# Patient Record
Sex: MALE | Race: WHITE | NOT HISPANIC OR LATINO | Employment: FULL TIME | URBAN - METROPOLITAN AREA
[De-identification: names, ages, dates, MRNs, and addresses within clinical notes are randomized per-mention and may not be internally consistent; named-entity substitution may affect disease eponyms.]

---

## 2024-05-06 ENCOUNTER — APPOINTMENT (OUTPATIENT)
Dept: RADIOLOGY | Facility: HOSPITAL | Age: 49
DRG: 062 | End: 2024-05-06
Payer: COMMERCIAL

## 2024-05-06 ENCOUNTER — HOSPITAL ENCOUNTER (INPATIENT)
Facility: HOSPITAL | Age: 49
LOS: 2 days | Discharge: HOME | DRG: 062 | End: 2024-05-09
Attending: STUDENT IN AN ORGANIZED HEALTH CARE EDUCATION/TRAINING PROGRAM | Admitting: INTERNAL MEDICINE
Payer: COMMERCIAL

## 2024-05-06 ENCOUNTER — APPOINTMENT (OUTPATIENT)
Dept: CARDIOLOGY | Facility: HOSPITAL | Age: 49
DRG: 062 | End: 2024-05-06
Payer: COMMERCIAL

## 2024-05-06 DIAGNOSIS — R51.9 ACUTE NONINTRACTABLE HEADACHE, UNSPECIFIED HEADACHE TYPE: ICD-10-CM

## 2024-05-06 DIAGNOSIS — I63.9 STROKE ABORTED BY ADMINISTRATION OF THROMBOLYTIC AGENT (MULTI): Primary | ICD-10-CM

## 2024-05-06 LAB
ALBUMIN SERPL BCP-MCNC: 4.7 G/DL (ref 3.4–5)
ALP SERPL-CCNC: 65 U/L (ref 33–120)
ALT SERPL W P-5'-P-CCNC: 32 U/L (ref 10–52)
ANION GAP SERPL CALC-SCNC: 13 MMOL/L (ref 10–20)
APTT PPP: 28 SECONDS (ref 27–38)
AST SERPL W P-5'-P-CCNC: 28 U/L (ref 9–39)
BASOPHILS # BLD AUTO: 0.06 X10*3/UL (ref 0–0.1)
BASOPHILS NFR BLD AUTO: 0.6 %
BILIRUB SERPL-MCNC: 0.5 MG/DL (ref 0–1.2)
BUN SERPL-MCNC: 22 MG/DL (ref 6–23)
CALCIUM SERPL-MCNC: 9.9 MG/DL (ref 8.6–10.3)
CARDIAC TROPONIN I PNL SERPL HS: <3 NG/L (ref 0–20)
CHLORIDE SERPL-SCNC: 103 MMOL/L (ref 98–107)
CO2 SERPL-SCNC: 26 MMOL/L (ref 21–32)
CREAT SERPL-MCNC: 1.27 MG/DL (ref 0.5–1.3)
EGFRCR SERPLBLD CKD-EPI 2021: 70 ML/MIN/1.73M*2
EOSINOPHIL # BLD AUTO: 0.49 X10*3/UL (ref 0–0.7)
EOSINOPHIL NFR BLD AUTO: 5.3 %
ERYTHROCYTE [DISTWIDTH] IN BLOOD BY AUTOMATED COUNT: 13 % (ref 11.5–14.5)
GLUCOSE BLD MANUAL STRIP-MCNC: 114 MG/DL (ref 74–99)
GLUCOSE SERPL-MCNC: 111 MG/DL (ref 74–99)
HCT VFR BLD AUTO: 44 % (ref 41–52)
HGB BLD-MCNC: 14.3 G/DL (ref 13.5–17.5)
IMM GRANULOCYTES # BLD AUTO: 0.01 X10*3/UL (ref 0–0.7)
IMM GRANULOCYTES NFR BLD AUTO: 0.1 % (ref 0–0.9)
INR PPP: 1.1 (ref 0.9–1.1)
LYMPHOCYTES # BLD AUTO: 5.98 X10*3/UL (ref 1.2–4.8)
LYMPHOCYTES NFR BLD AUTO: 64.2 %
MCH RBC QN AUTO: 27.9 PG (ref 26–34)
MCHC RBC AUTO-ENTMCNC: 32.5 G/DL (ref 32–36)
MCV RBC AUTO: 86 FL (ref 80–100)
MONOCYTES # BLD AUTO: 0.68 X10*3/UL (ref 0.1–1)
MONOCYTES NFR BLD AUTO: 7.3 %
NEUTROPHILS # BLD AUTO: 2.09 X10*3/UL (ref 1.2–7.7)
NEUTROPHILS NFR BLD AUTO: 22.5 %
NRBC BLD-RTO: 0.2 /100 WBCS (ref 0–0)
PLATELET # BLD AUTO: 238 X10*3/UL (ref 150–450)
POTASSIUM SERPL-SCNC: 3.7 MMOL/L (ref 3.5–5.3)
PROT SERPL-MCNC: 6.6 G/DL (ref 6.4–8.2)
PROTHROMBIN TIME: 12.6 SECONDS (ref 9.8–12.8)
RBC # BLD AUTO: 5.12 X10*6/UL (ref 4.5–5.9)
SODIUM SERPL-SCNC: 138 MMOL/L (ref 136–145)
WBC # BLD AUTO: 9.3 X10*3/UL (ref 4.4–11.3)

## 2024-05-06 PROCEDURE — 99291 CRITICAL CARE FIRST HOUR: CPT | Mod: 25 | Performed by: STUDENT IN AN ORGANIZED HEALTH CARE EDUCATION/TRAINING PROGRAM

## 2024-05-06 PROCEDURE — 70496 CT ANGIOGRAPHY HEAD: CPT | Performed by: RADIOLOGY

## 2024-05-06 PROCEDURE — 70498 CT ANGIOGRAPHY NECK: CPT | Performed by: RADIOLOGY

## 2024-05-06 PROCEDURE — 2500000004 HC RX 250 GENERAL PHARMACY W/ HCPCS (ALT 636 FOR OP/ED): Mod: JZ | Performed by: STUDENT IN AN ORGANIZED HEALTH CARE EDUCATION/TRAINING PROGRAM

## 2024-05-06 PROCEDURE — 82947 ASSAY GLUCOSE BLOOD QUANT: CPT

## 2024-05-06 PROCEDURE — 85610 PROTHROMBIN TIME: CPT | Performed by: STUDENT IN AN ORGANIZED HEALTH CARE EDUCATION/TRAINING PROGRAM

## 2024-05-06 PROCEDURE — 85025 COMPLETE CBC W/AUTO DIFF WBC: CPT | Performed by: STUDENT IN AN ORGANIZED HEALTH CARE EDUCATION/TRAINING PROGRAM

## 2024-05-06 PROCEDURE — 2500000004 HC RX 250 GENERAL PHARMACY W/ HCPCS (ALT 636 FOR OP/ED)

## 2024-05-06 PROCEDURE — 96375 TX/PRO/DX INJ NEW DRUG ADDON: CPT

## 2024-05-06 PROCEDURE — 36415 COLL VENOUS BLD VENIPUNCTURE: CPT | Performed by: STUDENT IN AN ORGANIZED HEALTH CARE EDUCATION/TRAINING PROGRAM

## 2024-05-06 PROCEDURE — 99291 CRITICAL CARE FIRST HOUR: CPT | Performed by: STUDENT IN AN ORGANIZED HEALTH CARE EDUCATION/TRAINING PROGRAM

## 2024-05-06 PROCEDURE — 84165 PROTEIN E-PHORESIS SERUM: CPT | Performed by: NURSE PRACTITIONER

## 2024-05-06 PROCEDURE — 84484 ASSAY OF TROPONIN QUANT: CPT | Performed by: STUDENT IN AN ORGANIZED HEALTH CARE EDUCATION/TRAINING PROGRAM

## 2024-05-06 PROCEDURE — 93010 ELECTROCARDIOGRAM REPORT: CPT | Performed by: STUDENT IN AN ORGANIZED HEALTH CARE EDUCATION/TRAINING PROGRAM

## 2024-05-06 PROCEDURE — 70450 CT HEAD/BRAIN W/O DYE: CPT

## 2024-05-06 PROCEDURE — 86334 IMMUNOFIX E-PHORESIS SERUM: CPT | Mod: STJLAB | Performed by: NURSE PRACTITIONER

## 2024-05-06 PROCEDURE — 83718 ASSAY OF LIPOPROTEIN: CPT | Performed by: NURSE PRACTITIONER

## 2024-05-06 PROCEDURE — 80053 COMPREHEN METABOLIC PANEL: CPT | Performed by: STUDENT IN AN ORGANIZED HEALTH CARE EDUCATION/TRAINING PROGRAM

## 2024-05-06 PROCEDURE — 2550000001 HC RX 255 CONTRASTS: Performed by: STUDENT IN AN ORGANIZED HEALTH CARE EDUCATION/TRAINING PROGRAM

## 2024-05-06 PROCEDURE — 86320 SERUM IMMUNOELECTROPHORESIS: CPT | Performed by: NURSE PRACTITIONER

## 2024-05-06 PROCEDURE — 70498 CT ANGIOGRAPHY NECK: CPT

## 2024-05-06 PROCEDURE — 93005 ELECTROCARDIOGRAM TRACING: CPT

## 2024-05-06 PROCEDURE — 96374 THER/PROPH/DIAG INJ IV PUSH: CPT

## 2024-05-06 PROCEDURE — 83036 HEMOGLOBIN GLYCOSYLATED A1C: CPT | Mod: STJLAB | Performed by: NURSE PRACTITIONER

## 2024-05-06 PROCEDURE — 85730 THROMBOPLASTIN TIME PARTIAL: CPT | Performed by: STUDENT IN AN ORGANIZED HEALTH CARE EDUCATION/TRAINING PROGRAM

## 2024-05-06 RX ORDER — ONDANSETRON HYDROCHLORIDE 2 MG/ML
4 INJECTION, SOLUTION INTRAVENOUS ONCE
Status: COMPLETED | OUTPATIENT
Start: 2024-05-06 | End: 2024-05-06

## 2024-05-06 RX ORDER — ONDANSETRON HYDROCHLORIDE 2 MG/ML
INJECTION, SOLUTION INTRAVENOUS
Status: COMPLETED
Start: 2024-05-06 | End: 2024-05-06

## 2024-05-06 RX ORDER — HYDRALAZINE HYDROCHLORIDE 20 MG/ML
5 INJECTION INTRAMUSCULAR; INTRAVENOUS EVERY 4 HOURS PRN
Status: DISCONTINUED | OUTPATIENT
Start: 2024-05-06 | End: 2024-05-09 | Stop reason: HOSPADM

## 2024-05-06 RX ADMIN — ONDANSETRON 4 MG: 2 INJECTION INTRAMUSCULAR; INTRAVENOUS at 23:29

## 2024-05-06 RX ADMIN — ONDANSETRON HYDROCHLORIDE 4 MG: 2 INJECTION, SOLUTION INTRAVENOUS at 23:29

## 2024-05-06 RX ADMIN — IOHEXOL 70 ML: 350 INJECTION, SOLUTION INTRAVENOUS at 22:55

## 2024-05-06 RX ADMIN — Medication 21 MG: at 22:38

## 2024-05-06 ASSESSMENT — PAIN DESCRIPTION - PAIN TYPE: TYPE: ACUTE PAIN

## 2024-05-06 ASSESSMENT — PAIN SCALES - GENERAL
PAINLEVEL_OUTOF10: 4
PAINLEVEL_OUTOF10: 0 - NO PAIN
PAINLEVEL_OUTOF10: 9

## 2024-05-06 ASSESSMENT — PAIN - FUNCTIONAL ASSESSMENT: PAIN_FUNCTIONAL_ASSESSMENT: 0-10

## 2024-05-06 ASSESSMENT — PAIN DESCRIPTION - LOCATION: LOCATION: HEAD

## 2024-05-07 ENCOUNTER — APPOINTMENT (OUTPATIENT)
Dept: CARDIOLOGY | Facility: HOSPITAL | Age: 49
DRG: 062 | End: 2024-05-07
Payer: COMMERCIAL

## 2024-05-07 ENCOUNTER — APPOINTMENT (OUTPATIENT)
Dept: RADIOLOGY | Facility: HOSPITAL | Age: 49
DRG: 062 | End: 2024-05-07
Payer: COMMERCIAL

## 2024-05-07 PROBLEM — R00.1 BRADYCARDIA: Status: ACTIVE | Noted: 2024-05-07

## 2024-05-07 LAB
CHOLEST SERPL-MCNC: 189 MG/DL (ref 0–199)
CHOLESTEROL/HDL RATIO: 3
CRP SERPL-MCNC: <0.1 MG/DL
ERYTHROCYTE [SEDIMENTATION RATE] IN BLOOD BY WESTERGREN METHOD: <1 MM/H (ref 0–15)
GLUCOSE BLD MANUAL STRIP-MCNC: 108 MG/DL (ref 74–99)
GLUCOSE BLD MANUAL STRIP-MCNC: 97 MG/DL (ref 74–99)
HDLC SERPL-MCNC: 62.5 MG/DL
NON-HDL CHOLESTEROL: 127 MG/DL (ref 0–149)

## 2024-05-07 PROCEDURE — 36415 COLL VENOUS BLD VENIPUNCTURE: CPT | Performed by: NURSE PRACTITIONER

## 2024-05-07 PROCEDURE — 97116 GAIT TRAINING THERAPY: CPT | Mod: GP | Performed by: PHYSICAL THERAPIST

## 2024-05-07 PROCEDURE — 70498 CT ANGIOGRAPHY NECK: CPT

## 2024-05-07 PROCEDURE — 99291 CRITICAL CARE FIRST HOUR: CPT

## 2024-05-07 PROCEDURE — 85613 RUSSELL VIPER VENOM DILUTED: CPT | Mod: STJLAB | Performed by: NURSE PRACTITIONER

## 2024-05-07 PROCEDURE — 81240 F2 GENE: CPT | Performed by: NURSE PRACTITIONER

## 2024-05-07 PROCEDURE — 99223 1ST HOSP IP/OBS HIGH 75: CPT | Performed by: NURSE PRACTITIONER

## 2024-05-07 PROCEDURE — 70450 CT HEAD/BRAIN W/O DYE: CPT

## 2024-05-07 PROCEDURE — 93306 TTE W/DOPPLER COMPLETE: CPT | Performed by: INTERNAL MEDICINE

## 2024-05-07 PROCEDURE — 2020000001 HC ICU ROOM DAILY

## 2024-05-07 PROCEDURE — 81241 F5 GENE: CPT | Performed by: NURSE PRACTITIONER

## 2024-05-07 PROCEDURE — 84155 ASSAY OF PROTEIN SERUM: CPT | Mod: STJLAB | Performed by: NURSE PRACTITIONER

## 2024-05-07 PROCEDURE — 92610 EVALUATE SWALLOWING FUNCTION: CPT | Mod: GN | Performed by: SPEECH-LANGUAGE PATHOLOGIST

## 2024-05-07 PROCEDURE — 97165 OT EVAL LOW COMPLEX 30 MIN: CPT | Mod: GO

## 2024-05-07 PROCEDURE — 85302 CLOT INHIBIT PROT C ANTIGEN: CPT | Performed by: NURSE PRACTITIONER

## 2024-05-07 PROCEDURE — 70498 CT ANGIOGRAPHY NECK: CPT | Performed by: RADIOLOGY

## 2024-05-07 PROCEDURE — 2500000001 HC RX 250 WO HCPCS SELF ADMINISTERED DRUGS (ALT 637 FOR MEDICARE OP)

## 2024-05-07 PROCEDURE — 83090 ASSAY OF HOMOCYSTEINE: CPT | Mod: STJLAB | Performed by: NURSE PRACTITIONER

## 2024-05-07 PROCEDURE — 99233 SBSQ HOSP IP/OBS HIGH 50: CPT

## 2024-05-07 PROCEDURE — 70496 CT ANGIOGRAPHY HEAD: CPT | Performed by: RADIOLOGY

## 2024-05-07 PROCEDURE — 70551 MRI BRAIN STEM W/O DYE: CPT

## 2024-05-07 PROCEDURE — 3E03317 INTRODUCTION OF OTHER THROMBOLYTIC INTO PERIPHERAL VEIN, PERCUTANEOUS APPROACH: ICD-10-PCS

## 2024-05-07 PROCEDURE — 2550000001 HC RX 255 CONTRASTS: Performed by: NURSE PRACTITIONER

## 2024-05-07 PROCEDURE — 93306 TTE W/DOPPLER COMPLETE: CPT

## 2024-05-07 PROCEDURE — 85652 RBC SED RATE AUTOMATED: CPT | Performed by: NURSE PRACTITIONER

## 2024-05-07 PROCEDURE — 2500000001 HC RX 250 WO HCPCS SELF ADMINISTERED DRUGS (ALT 637 FOR MEDICARE OP): Performed by: NURSE PRACTITIONER

## 2024-05-07 PROCEDURE — 86147 CARDIOLIPIN ANTIBODY EA IG: CPT | Mod: STJLAB | Performed by: NURSE PRACTITIONER

## 2024-05-07 PROCEDURE — G0452 MOLECULAR PATHOLOGY INTERPR: HCPCS | Performed by: NURSE PRACTITIONER

## 2024-05-07 PROCEDURE — 2500000004 HC RX 250 GENERAL PHARMACY W/ HCPCS (ALT 636 FOR OP/ED)

## 2024-05-07 PROCEDURE — 86140 C-REACTIVE PROTEIN: CPT | Performed by: NURSE PRACTITIONER

## 2024-05-07 PROCEDURE — 97161 PT EVAL LOW COMPLEX 20 MIN: CPT | Mod: GP | Performed by: PHYSICAL THERAPIST

## 2024-05-07 PROCEDURE — 85305 CLOT INHIBIT PROT S TOTAL: CPT | Performed by: NURSE PRACTITIONER

## 2024-05-07 PROCEDURE — 82947 ASSAY GLUCOSE BLOOD QUANT: CPT

## 2024-05-07 PROCEDURE — 86146 BETA-2 GLYCOPROTEIN ANTIBODY: CPT | Mod: STJLAB | Performed by: NURSE PRACTITIONER

## 2024-05-07 RX ORDER — ACETAMINOPHEN 160 MG/5ML
650 SOLUTION ORAL EVERY 4 HOURS PRN
Status: DISCONTINUED | OUTPATIENT
Start: 2024-05-07 | End: 2024-05-09 | Stop reason: HOSPADM

## 2024-05-07 RX ORDER — DOCUSATE SODIUM 100 MG/1
100 CAPSULE, LIQUID FILLED ORAL 2 TIMES DAILY
Status: DISCONTINUED | OUTPATIENT
Start: 2024-05-07 | End: 2024-05-09 | Stop reason: HOSPADM

## 2024-05-07 RX ORDER — MAGNESIUM SULFATE HEPTAHYDRATE 40 MG/ML
4 INJECTION, SOLUTION INTRAVENOUS EVERY 6 HOURS PRN
Status: DISCONTINUED | OUTPATIENT
Start: 2024-05-07 | End: 2024-05-08

## 2024-05-07 RX ORDER — MULTIVIT-MIN/IRON FUM/FOLIC AC 7.5 MG-4
1 TABLET ORAL DAILY
COMMUNITY

## 2024-05-07 RX ORDER — POTASSIUM CHLORIDE 14.9 MG/ML
20 INJECTION INTRAVENOUS EVERY 6 HOURS PRN
Status: DISCONTINUED | OUTPATIENT
Start: 2024-05-07 | End: 2024-05-08

## 2024-05-07 RX ORDER — LABETALOL HYDROCHLORIDE 5 MG/ML
10 INJECTION, SOLUTION INTRAVENOUS EVERY 10 MIN PRN
Status: ACTIVE | OUTPATIENT
Start: 2024-05-07 | End: 2024-05-09

## 2024-05-07 RX ORDER — ATORVASTATIN CALCIUM 80 MG/1
80 TABLET, FILM COATED ORAL NIGHTLY
Status: DISCONTINUED | OUTPATIENT
Start: 2024-05-07 | End: 2024-05-09 | Stop reason: HOSPADM

## 2024-05-07 RX ORDER — ACETAMINOPHEN 650 MG/1
650 SUPPOSITORY RECTAL EVERY 4 HOURS PRN
Status: DISCONTINUED | OUTPATIENT
Start: 2024-05-07 | End: 2024-05-09 | Stop reason: HOSPADM

## 2024-05-07 RX ORDER — POTASSIUM CHLORIDE 20 MEQ/1
40 TABLET, EXTENDED RELEASE ORAL EVERY 6 HOURS PRN
Status: DISCONTINUED | OUTPATIENT
Start: 2024-05-07 | End: 2024-05-08

## 2024-05-07 RX ORDER — POTASSIUM CHLORIDE 1.5 G/1.58G
40 POWDER, FOR SOLUTION ORAL EVERY 6 HOURS PRN
Status: DISCONTINUED | OUTPATIENT
Start: 2024-05-07 | End: 2024-05-08

## 2024-05-07 RX ORDER — ACETAMINOPHEN 325 MG/1
650 TABLET ORAL EVERY 4 HOURS PRN
Status: DISCONTINUED | OUTPATIENT
Start: 2024-05-07 | End: 2024-05-09 | Stop reason: HOSPADM

## 2024-05-07 RX ORDER — CALCIUM GLUCONATE 20 MG/ML
2 INJECTION, SOLUTION INTRAVENOUS EVERY 6 HOURS PRN
Status: DISCONTINUED | OUTPATIENT
Start: 2024-05-07 | End: 2024-05-08

## 2024-05-07 RX ORDER — ONDANSETRON HYDROCHLORIDE 2 MG/ML
4 INJECTION, SOLUTION INTRAVENOUS ONCE
Status: COMPLETED | OUTPATIENT
Start: 2024-05-07 | End: 2024-05-07

## 2024-05-07 RX ADMIN — ONDANSETRON 4 MG: 2 INJECTION INTRAMUSCULAR; INTRAVENOUS at 05:42

## 2024-05-07 RX ADMIN — ACETAMINOPHEN 650 MG: 325 TABLET ORAL at 22:47

## 2024-05-07 RX ADMIN — IOHEXOL 70 ML: 350 INJECTION, SOLUTION INTRAVENOUS at 22:30

## 2024-05-07 RX ADMIN — ACETAMINOPHEN 650 MG: 650 SUSPENSION ORAL at 10:35

## 2024-05-07 RX ADMIN — DOCUSATE SODIUM 100 MG: 100 CAPSULE, LIQUID FILLED ORAL at 09:00

## 2024-05-07 RX ADMIN — ATORVASTATIN CALCIUM 80 MG: 80 TABLET, FILM COATED ORAL at 20:43

## 2024-05-07 RX ADMIN — DOCUSATE SODIUM 100 MG: 100 CAPSULE, LIQUID FILLED ORAL at 20:43

## 2024-05-07 SDOH — SOCIAL STABILITY: SOCIAL INSECURITY: ARE THERE ANY APPARENT SIGNS OF INJURIES/BEHAVIORS THAT COULD BE RELATED TO ABUSE/NEGLECT?: NO

## 2024-05-07 SDOH — SOCIAL STABILITY: SOCIAL INSECURITY: DO YOU FEEL UNSAFE GOING BACK TO THE PLACE WHERE YOU ARE LIVING?: NO

## 2024-05-07 SDOH — SOCIAL STABILITY: SOCIAL INSECURITY: HAS ANYONE EVER THREATENED TO HURT YOUR FAMILY OR YOUR PETS?: NO

## 2024-05-07 SDOH — SOCIAL STABILITY: SOCIAL INSECURITY: HAVE YOU HAD THOUGHTS OF HARMING ANYONE ELSE?: NO

## 2024-05-07 SDOH — SOCIAL STABILITY: SOCIAL INSECURITY: ABUSE: ADULT

## 2024-05-07 SDOH — SOCIAL STABILITY: SOCIAL INSECURITY: ARE YOU OR HAVE YOU BEEN THREATENED OR ABUSED PHYSICALLY, EMOTIONALLY, OR SEXUALLY BY ANYONE?: NO

## 2024-05-07 SDOH — SOCIAL STABILITY: SOCIAL INSECURITY: WERE YOU ABLE TO COMPLETE ALL THE BEHAVIORAL HEALTH SCREENINGS?: YES

## 2024-05-07 SDOH — SOCIAL STABILITY: SOCIAL INSECURITY: DOES ANYONE TRY TO KEEP YOU FROM HAVING/CONTACTING OTHER FRIENDS OR DOING THINGS OUTSIDE YOUR HOME?: NO

## 2024-05-07 SDOH — SOCIAL STABILITY: SOCIAL INSECURITY: DO YOU FEEL ANYONE HAS EXPLOITED OR TAKEN ADVANTAGE OF YOU FINANCIALLY OR OF YOUR PERSONAL PROPERTY?: NO

## 2024-05-07 ASSESSMENT — PAIN SCALES - GENERAL
PAINLEVEL_OUTOF10: 0 - NO PAIN
PAINLEVEL_OUTOF10: 2
PAINLEVEL_OUTOF10: 1
PAINLEVEL_OUTOF10: 0 - NO PAIN
PAINLEVEL_OUTOF10: 0 - NO PAIN
PAINLEVEL_OUTOF10: 3
PAINLEVEL_OUTOF10: 0 - NO PAIN
PAINLEVEL_OUTOF10: 1
PAINLEVEL_OUTOF10: 4
PAINLEVEL_OUTOF10: 0 - NO PAIN
PAINLEVEL_OUTOF10: 1
PAINLEVEL_OUTOF10: 3

## 2024-05-07 ASSESSMENT — COGNITIVE AND FUNCTIONAL STATUS - GENERAL
DAILY ACTIVITIY SCORE: 24
CLIMB 3 TO 5 STEPS WITH RAILING: A LOT
DRESSING REGULAR LOWER BODY CLOTHING: A LITTLE
PERSONAL GROOMING: A LITTLE
DRESSING REGULAR UPPER BODY CLOTHING: A LITTLE
DAILY ACTIVITIY SCORE: 19
MOBILITY SCORE: 22
HELP NEEDED FOR BATHING: A LITTLE
TOILETING: A LITTLE
PATIENT BASELINE BEDBOUND: NO
MOBILITY SCORE: 24
MOBILITY SCORE: 24
DAILY ACTIVITIY SCORE: 24

## 2024-05-07 ASSESSMENT — PAIN - FUNCTIONAL ASSESSMENT
PAIN_FUNCTIONAL_ASSESSMENT: 0-10

## 2024-05-07 ASSESSMENT — ACTIVITIES OF DAILY LIVING (ADL)
BATHING: INDEPENDENT
LACK_OF_TRANSPORTATION: NO
ADEQUATE_TO_COMPLETE_ADL: YES
BATHING_ASSISTANCE: INDEPENDENT
HEARING - RIGHT EAR: FUNCTIONAL
WALKS IN HOME: INDEPENDENT
GROOMING: INDEPENDENT
PATIENT'S MEMORY ADEQUATE TO SAFELY COMPLETE DAILY ACTIVITIES?: YES
TOILETING: INDEPENDENT
FEEDING YOURSELF: INDEPENDENT
DRESSING YOURSELF: INDEPENDENT
JUDGMENT_ADEQUATE_SAFELY_COMPLETE_DAILY_ACTIVITIES: YES
HEARING - LEFT EAR: FUNCTIONAL
ADL_ASSISTANCE: INDEPENDENT

## 2024-05-07 ASSESSMENT — PAIN DESCRIPTION - DESCRIPTORS: DESCRIPTORS: ACHING

## 2024-05-07 ASSESSMENT — PATIENT HEALTH QUESTIONNAIRE - PHQ9
SUM OF ALL RESPONSES TO PHQ9 QUESTIONS 1 & 2: 0
2. FEELING DOWN, DEPRESSED OR HOPELESS: NOT AT ALL
1. LITTLE INTEREST OR PLEASURE IN DOING THINGS: NOT AT ALL

## 2024-05-07 ASSESSMENT — LIFESTYLE VARIABLES
SKIP TO QUESTIONS 9-10: 0
HOW OFTEN DO YOU HAVE 6 OR MORE DRINKS ON ONE OCCASION: LESS THAN MONTHLY
AUDIT-C TOTAL SCORE: 2
HOW OFTEN DO YOU HAVE A DRINK CONTAINING ALCOHOL: MONTHLY OR LESS
HOW MANY STANDARD DRINKS CONTAINING ALCOHOL DO YOU HAVE ON A TYPICAL DAY: 1 OR 2
AUDIT-C TOTAL SCORE: 2

## 2024-05-07 ASSESSMENT — PAIN DESCRIPTION - LOCATION: LOCATION: HEAD

## 2024-05-07 NOTE — CARE PLAN
The patient's goals for the shift include      The clinical goals for the shift include pt will remain stable neurologically    Problem: Pain  Goal: My pain/discomfort is manageable  Outcome: Progressing     Problem: Safety  Goal: Patient will be injury free during hospitalization  Outcome: Progressing  Goal: I will remain free of falls  Outcome: Progressing     Problem: General Stroke  Goal: Demonstrate improvement in neurological exam throughout the shift  Outcome: Progressing  Note: S/p TNK. Neuro exam as ordered  Goal: Maintain BP within ordered limits throughout shift  Outcome: Progressing  Goal: Participate in treatment (ie., meds, therapy) throughout shift  Outcome: Progressing  Goal: No symptoms of hemorrhage throughout shift  Outcome: Progressing

## 2024-05-07 NOTE — PROGRESS NOTES
"Nutrition Diet Education  Provider consult order   Nutrition Note:  Jerardo Olmedo is a 49 y.o. male presenting persistent nausea with right sided weakness,  inability to walk or stand without falling over, pins and needle sensation in his right arm; +TNK. Length of stay complicated by bradycardia with CT findings of + thrombus with marked narrowing of right vertebral artery. Neurology involved in pt care.     Past Medical History   has no past medical history on file.   Surgical History   has no past surgical history on file.     DIET: NPO; passed RN swallow eval however awaiting Neurology and CCM evaluation.    LABS: most recent lipid panel 4/10/2024 (NONFASTING per pt and wife)  T chol 220, HDL 60, , Triglyceride 127.      Education Documentation  Nutrition Care Manual, taught by Lynn Yeh RD at 5/7/2024 10:45 AM.  Learner: Family, Patient  Readiness: Eager  Method: Explanation, Handout  Response: Verbalizes Understanding  Comment: \"Cholesterol Score\" per AHA and \"Building A Heart Healthy Plate\"per National Lipid Association      5/7: Met with pt and wife at bedside; [facility] services reviewed. Results of most recent lipid panel provided and reviewed however would benefit from current lipid panel that is FASTING evaluation; include HA1C as per stroke protocol.   Per staff, pt exercises 5-6 days a week and very healthy individual. Pt and wife deny further questions/concerns at present time.     Follow Up:     Time Spent (min): 30 minutes  Follow up: Provided information on outpatient nutrition therapy services, Provided inpatient RDN contact information  Last Date of Nutrition Visit: 05/07/24  Nutrition Follow-Up Needed?: 7-10 days  Follow up Comment: ks, stroke edu done   "

## 2024-05-07 NOTE — PROGRESS NOTES
05/07/24 1121   Discharge Planning   Living Arrangements Spouse/significant other;Children   Support Systems Spouse/significant other   Assistance Needed none   Type of Residence Private residence   Patient expects to be discharged to: TBD pending therapy diandra     Spoke to patient and family at bedside to explain my role in discharge planning. Patient states he lives at home with wife and kids and is independent with his care. Therapy pending at this time. Patient prefers to go home, but will discuss therapy recommendations for best discharge planning. TCC to follow.

## 2024-05-07 NOTE — PROGRESS NOTES
"Speech-Language Pathology    SLP Adult Inpatient Speech-Language Pathology Clinical Swallow Evaluation    Patient Name: Jerardo Olmedo  MRN: [id number]  Today's Date: 5/7/2024   Time Calculation  Start Time: 1315  Stop Time: 1328  Time Calculation (min): 13 min         Current Problem:   1. Stroke aborted by administration of thrombolytic agent (Multi)  Critical Care    Critical Care    Transthoracic Echo (TTE) Complete    Transthoracic Echo (TTE) Complete      2. Acute nonintractable headache, unspecified headache type              Recommendations:  Risk for Aspiration: Other (Comment) (not highly suspected at this time)  Solid Diet Recommendations : Regular (IDDSI Level 7)  Liquid Diet Recommendations: Thin (IDDSI Level 0)  Compensatory Swallowing Strategies: Upright 90 degrees as possible for all oral intake, Small bites/sips, Remain upright for 20-30 minutes after meals, Eat/feed slowly  Medication Administration Recommendations: Whole, With Liquid, Other (Comment) (or as best tolerated)      Assessment:  Assessment Results:     Patient presents with suspected functional oropharyngeal swallow upon completion of clinical swallow evaluation at bedside this date. Examination of oral mechanism was unremarkable. Patient \"passed\" [place] Swallow Protocol (3 oz water challenge). Patient tolerated PO trials of ice chips, thin liquids via straw, pureed solids, soft solids, and regular solids absent of overt s/s of aspiration.     Per this assessment, patient is appropriate to continue baseline diet with standard aspiration precautions.     Of note, patient with decreased vocal intensity, which he attributes to being tired in addition to soft-spoken at baseline. Patient denies speech/voice changes. Given MRI confirming CVA, further assessment by SLP for subtle speech/language/cognitive deficits to be considered as warranted at next level of care/in outpatient setting.    ST to sign off, but remains available upon " "re-consultation should new concerns arise.    Treatment Provided: No      Plan:  Inpatient/Swing Bed or Outpatient: Inpatient  SLP Plan: No skilled SLP  No Skilled SLP: Independent with swallowing  SLP Discharge Recommendations: D/C as patient is functional for this level of care  Discussed POC: Patient, Caregiver/family, Nursing      Subjective   Patient reports right-sided facial numbness and globus sensation that has resolved since yesterday.      General Visit Information:  Patient Class: Inpatient  Living Environment: Home, Live with __, Other (comment) (spouse)  Arrival: Family/caregiver present  Caregiver Feedback: RN reports patient passed nursing swallow screen, but PO diet has not been initiated. Neurology confirming patient is cleared to start PO diet pending results of swallowing evaluation.  Ordering Physician: CHAD Cool  Reason for Referral: Swallow evaluation  Past Medical History Relevant to Rehab: No significant prior history. S/p TNK.  Prior Level of Function: WFL  Developmental Status: Age Appropriate  Patient Seen During This Visit: Yes  Total Number of Visits : 1  Prior to Session Communication: Bedside nurse  Date of Onset: 05/07/24  Date of Order: 05/07/24  [employer] Diet: Regular solids and thin liquids  Current Diet : NPO pending results of clinical swallow evaluation  Dysphagia Diagnosis: Other (Comment) (suspected functional oropharyngeal swallow)    Objective     Baseline Assessment:  Respiratory Status: Room air  History of Intubation: No  Behavior/Cognition: Cooperative, Alert, Lethargic  Hearing: Within Functional Limits  Patient Positioning: Upright in Bed  Baseline Vocal Quality: Normal  Volitional Cough: Strong  Volitional Swallow: Within Functional Limits    Relevant Imaging:    MRI 5/7/24: \"IMPRESSION:  * Acute infarction in the inferior portion of the right cerebellar  hemisphere without associated hemorrhage or mass effect. The  brainstem is spared *No other " "evidence of intracranial mass,  extra-axial collection or hemorrhage.\"    CTA 5/6/24: \"  IMPRESSION:  Thrombus with marked narrowing of V4 segment of right vertebral  artery.\"    CT Head 5/6/24: \"IMPRESSION:  1. No acute intracranial hemorrhage or mass effect.\"    Pain:  Pain Assessment: 0-10  Pain Score: 1  Pain Location: Head  Pain Descriptors: Aching       Oral/Motor Assessment:  Oral Hygiene: Good  Dentition: Adequate/Natural  Oral Motor: Within Functional Limits  Apraxia: None present  Intelligibility: Intelligible  Breath Support: Adequate for speech  Hearing: Within Functional Limits      Consistencies Trialed:  Consistencies Trialed: Yes  Consistencies Trialed: Ice Chips, Thin (IDDSI Level 0) - Straw, Pureed/extremely thick (IDDSI Level 4), Soft & bite sized/chopped (IDDSI Level 6), Regular (IDDSI Level 7)      Clinical Observations:  Patient Positioning: Upright in Bed  Management of Oral Secretions: Adequate  Was The 3 oz Swallow Protocol Completed: Yes, Other (Comment) (\"passed\")  Poor Management of Oral Secretions: No    Inpatient:    Education:  Learner patient; wife   Barriers to Learning none   Method demonstration; verbal   Education - Topic ST provided patient education regarding role of ST, purpose of assessment, clinical impressions, and recommendations. Patient verbalized comprehension. ST further coordinated with RN regarding recommendations per this assessment, with RN verbalizing understanding.     Outcome    Verbalized understanding and agreement       "

## 2024-05-07 NOTE — PROGRESS NOTES
Jerardo Olmedo is a 49 y.o. male on day 0 of admission presenting with Stroke aborted by administration of thrombolytic agent (Multi).      Subjective   Seen at bedside this AM while working with PT. He reports feeling normal relative to his baseline with no ongoing weakness, numbness, tingling.     He denies any Hx of tobacco use or any other drug use.     He denies any known FamHx of thrombotic disease, inherited coagulopathies, or stroke.        Objective     Last Recorded Vitals  /60   Pulse 70   Temp 36 °C (96.8 °F) (Temporal)   Resp 17   Wt 84.2 kg (185 lb 10 oz)   SpO2 98%   Intake/Output last 3 Shifts:  No intake or output data in the 24 hours ending 05/07/24 0737    Admission Weight  Weight: 82.5 kg (181 lb 14.1 oz) (05/06/24 2224)    Daily Weight  05/07/24 : 84.2 kg (185 lb 10 oz)      Physical Exam  Constitutional:       Appearance: Normal appearance.   HENT:      Head: Atraumatic.   Eyes:      Extraocular Movements: Extraocular movements intact.      Comments: Nystagmus no longer apparent.    Cardiovascular:      Rate and Rhythm: Normal rate.      Heart sounds: Normal heart sounds.   Pulmonary:      Effort: Pulmonary effort is normal. No respiratory distress.   Musculoskeletal:         General: Normal range of motion.      Right lower leg: No edema.      Left lower leg: No edema.   Neurological:      General: No focal deficit present.      Mental Status: He is alert and oriented to person, place, and time. Mental status is at baseline.      Cranial Nerves: Cranial nerves 2-12 are intact.      Motor: Motor function is intact. No weakness or abnormal muscle tone.      Gait: Gait is intact.   Psychiatric:         Mood and Affect: Mood normal.         Thought Content: Thought content normal.         Relevant Results  MR brain wo IV contrast    Addendum Date: 5/7/2024    Interpreted By:  Hector Will, ADDENDUM: Hector Will discussed the significance and urgency of this critical  finding by telephone with  ACE VIDES on 5/7/2024 at 12:37 pm.  (**-RCF-**) Findings:  See findings.     Signed by: Hector Will 5/7/2024 12:37 PM   -------- ORIGINAL REPORT -------- Dictation workstation:   [id number]    Result Date: 5/7/2024  Interpreted By:  Hector Will, STUDY: MR BRAIN WO IV CONTRAST;  5/7/2024 12:24 pm   INDICATION: Signs/Symptoms:stroke.   COMPARISON: None.   ACCESSION NUMBER(S): [id number]   ORDERING CLINICIAN: ACE VIDES   TECHNIQUE: The brain was studied in the sagittal, axial and coronal planes utilizing FLAIR, T1 and T2 weighted images.   FINDINGS: There is a normal-size ventricular system.  There is no evidence of intracranial mass or extra-axial collection.  The skull base, paranasal sinuses and orbital structures are unremarkable. Diffusion weighted images and associated ADC maps of the brain demonstrate restriction in the right cerebellar hemisphere consistent with acute infarction in the inferior hemisphere within the distribution of right PICA. No abnormal diffusion restriction within the medulla.. Gradient echo T2 weighted images fail to demonstrate hemosiderin deposition or other evidence of hemorrhage.       * Acute infarction in the inferior portion of the right cerebellar hemisphere without associated hemorrhage or mass effect. The brainstem is spared *No other evidence of intracranial mass, extra-axial collection or hemorrhage.   MACRO: none   Signed by: eHctor Will 5/7/2024 12:30 PM Dictation workstation:   [id number]    ECG 12 lead    Result Date: 5/7/2024  Normal sinus rhythm Normal ECG No previous ECGs available    CT head wo IV contrast    Result Date: 5/6/2024  Interpreted By:  Nazario Mtz, STUDY: CT HEAD WO IV CONTRAST;  5/6/2024 11:19 pm   INDICATION: Signs/Symptoms:Headache post tnk.   COMPARISON: Same-day CT   ACCESSION NUMBER(S): [id number]   ORDERING CLINICIAN: JO-ANN MCKINLEY   TECHNIQUE: Axial noncontrast CT images of the  head. Sagittal and coronal reformats were provided.   FINDINGS: BRAIN: No acute intracranial hemorrhage. No mass effect or midline shift. Gray-white matter interfaces are preserved.   VENTRICLES and EXTRA-AXIAL SPACES: Normal.   EXTRACRANIAL SOFT TISSUES:  Within normal limits.   PARANASAL SINUSES/MASTOIDS: The visualized paranasal sinuses and mastoid air cells are aerated.   BONES AND ORBITS: No displaced skull fracture. Orbits are within normal limits.   OTHER FINDINGS: None.       1. No acute intracranial hemorrhage or mass effect.   Signed by: Nazario Mtz 5/6/2024 11:46 PM Dictation workstation:   [id number]    CT brain attack angio head and neck W and WO IV contrast    Result Date: 5/6/2024  Interpreted By:  Wilder Marion, STUDY: CT BRAIN ATTACK ANGIO HEAD AND NECK W AND WO IV CONTRAST;  5/6/2024 10:55 pm   INDICATION: Signs/Symptoms:NIHSS 6.   COMPARISON: None   ACCESSION NUMBER(S): [id number]   ORDERING CLINICIAN: JO-ANN MCKINLEY   TECHNIQUE: Contiguous axial images of the head and neck were obtained after the intravenous administration of contrast. Coronal and sagittal reformatted images were obtained of the axial images. MIPS and 3D reformatted images were also performed and reviewed.   FINDINGS: The bilateral anterior cerebral arteries, middle cerebral arteries, and posterior cerebral arteries are grossly patent.   The bilateral common carotid internal carotid artery is grossly patent.   There is segment of thrombus with marked narrowing of the V4 segment of the right vertebral artery. The left vertebral artery is grossly patent.       Thrombus with marked narrowing of V4 segment of right vertebral artery.   MACRO: Wilder Marion discussed the significance and urgency of this critical finding by telephone with the emergency room physician at [facility] on 5/6/2024 at 11:23 pm.  (**-RCF-**) Findings:  See findings.   Signed by: Wilder Marion 5/6/2024 11:26 PM Dictation workstation:    [id number]    CT brain attack head wo IV contrast    Result Date: 5/6/2024  Interpreted By:  Nazario Mtz, STUDY: CT BRAIN ATTACK HEAD WO IV CONTRAST;  5/6/2024 10:16 pm   INDICATION: Signs/Symptoms:RUE + RLE weakness, R sided headache.   COMPARISON: None.   ACCESSION NUMBER(S): [id number]   ORDERING CLINICIAN: JO-ANN MCKINLEY   TECHNIQUE: Axial noncontrast CT images of the head. Sagittal and coronal reformats were provided.   FINDINGS: BRAIN: No acute intracranial hemorrhage. No mass effect or midline shift. Gray-white matter interfaces are preserved.   VENTRICLES and EXTRA-AXIAL SPACES: Normal.   EXTRACRANIAL SOFT TISSUES:  Within normal limits.   PARANASAL SINUSES/MASTOIDS: The visualized paranasal sinuses and mastoid air cells are aerated.   BONES AND ORBITS: No displaced skull fracture. Orbits are within normal limits.   OTHER FINDINGS: None.       1. No acute intracranial hemorrhage or mass effect.   MACRO: Nazario Mtz discussed the significance and urgency of this critical finding by telephone with  Dr. Justin Carrizales on 5/6/2024 at 10:21 pm.  (**-RCF-**) Findings:  See findings.   Signed by: Nazario Mtz 5/6/2024 10:24 PM Dictation workstation:   [id number]       Assessment/Plan   Principal Problem:    Stroke aborted by administration of thrombolytic agent (Multi)  Active Problems:    Bradycardia    Jerardo Olmedo is a 49 y.o. male with no reported past medical history who presented to the  ED on 5/6 and found to have CVA w/ thrombus in V4 segment of RVA and is s/p TNK administration. Patient without any apparent residual neuro deficits at thi time. Patient admitted to the ICU for close observation w/ Q1 neurochecks s/p TNK administration.       Plan  Neuro:  -q1 hr neurochecks  -CAM ICU assessment and ABCDEF bundle  -PT/OT  -Neuro on consult  -MRI this AM, results as above (PICA infarct), repeat CT tonight 2230 (24 hr s/p Tnk)  -PRN hydralazine with goal BP  <185/110  -Atorvastatin 80mg    CV:  -Continuous cardiac telemetry and Q1 vitals  -EKG: Normal sinus rhythm with a ventricular rate of 65 bpm,  ms, QRS 96 ms, QTc 459 ms    Pulm:  -No acute issues  -Maintain pulse ox > 92%    :  -BUN/Cr: 22/1.27  -Monitor daily BMP  -Strict I/Os  -Monitor and replace electrolytes per protocol    GI:  -Speech eval passed  -Regular diet   -GI prophylaxis: not indicated  -Bowel regimen: Colace    Endo:  -Accuchecks Q4  -Hypoglycemia protocol    Heme:  -H/H: 14.3/44.0  -Monitor daily CBC  -DVT Prophylaxis: SCDs    ID:  -afebrile, no leukocytosis  -Monitor for s/s of infection    Dispo: Admit to ICU for q1 hr neurochecks    Code Status: Full code       Patient seen and discussed with attending physician, Dr. Angelo.  Plan preliminary until cosigned by attending physician.    Arturo Garnett D.O.   Family Medicine PGY-1, [facility]  05/07/24

## 2024-05-07 NOTE — PROGRESS NOTES
Occupational Therapy    Evaluation    Patient Name: Jerardo Olmedo  MRN: [id number]  Today's Date: 5/7/2024  Time Calculation  Start Time: 1237  Stop Time: 1253  Time Calculation (min): 16 min    Assessment  IP OT Assessment  End of Session Communication: Bedside nurse  End of Session Patient Position: Bed, 3 rail up  Plan:  No Skilled OT: No acute OT goals identified  OT Frequency: OT eval only  OT - OK to Discharge: Yes (when medically appropriate)    Subjective   Current Problem:  1. Stroke aborted by administration of thrombolytic agent (Multi)  Critical Care    Critical Care    Transthoracic Echo (TTE) Complete    Transthoracic Echo (TTE) Complete      2. Acute nonintractable headache, unspecified headache type          General:  General  Reason for Referral: Stroke  Referred By: Ev  Past Medical History Relevant to Rehab: No significant PMH found  Co-Treatment: PT  Prior to Session Communication: Bedside nurse  Patient Position Received: Bed, 3 rail up (wife and RN present)  General Comment: To ED after sudden onset right-sided weakness, inability to walk or stand without falling over, pins and needle sensation in his right arm, headache. TNK administered at 2230 on 5/6/24.     CT brain 5/6: No acute intracranial hemorrhage or mass effect. CTA head 5/6: Thrombus with marked narrowing of V4 segment of right vertebral artery. MRI brain 5/7: Acute infarction in the inferior portion of the right cerebellar hemisphere without associated hemorrhage or mass effect. The brainstem is spared *No other evidence of intracranial mass,extra-axial collection or hemorrhage.  Precautions:     Vital Signs:  Heart Rate: 57  SpO2: 97 % (room air)  BP:  (131/61)  Pain:  Pain Assessment  Pain Assessment: 0-10  Pain Score: 1  Pain Type: Acute pain  Pain Location:  (headache, R sided)    Objective   Cognition:  Overall Cognitive Status: Within Functional Limits  Orientation Level: Oriented X4           Home Living:  Home  Living Comments: Lives at home with wife and 2 kids.  2 steps to enter.  1/2 bath on first floor.  2nd floor bedroom and full bath.  No AD use.  Independent with mobility, ADLs, IADLs.  Works as an elementary phys. ed. teacher.  Drives.  No falls.  Tub/shower, no seat or safety bars.        ADL:  Eating Assistance: Independent  Grooming Assistance: Independent  Bathing Assistance: Independent  UE Dressing Assistance: Independent  LE Dressing Assistance: Independent  Toileting Assistance with Device: Independent  Activity Tolerance:     Bed Mobility/Transfers: Bed Mobility  Bed Mobility: Yes  Bed Mobility 1  Bed Mobility 1: Supine to sitting, Sitting to supine  Level of Assistance 1: Independent    Transfers  Transfer:  (Independent sit<>stand at EOB)      Functional Mobility:  Functional Mobility  Functional Mobility Performed: Yes  Functional Mobility 1  Comments 1: Ambulated in room and hallway, independently  Sitting Balance:  Static Sitting Balance  Static Sitting-Comment/Number of Minutes: Normal  Dynamic Sitting Balance  Dynamic Sitting-Comments: Normal  Standing Balance:  Static Standing Balance  Static Standing-Comment/Number of Minutes: Normal  Dynamic Standing Balance  Dynamic Standing-Comments: Normal       Vision: Vision - Basic Assessment  Patient Visual Report:  (No current visual changes)   and Vision - Complex Assessment  Tracking: WFL  Visual Fields: WFL  Sensation:  Sensation Comment: WFL  Strength:  Strength Comments: 5/5 bilat. UEs  Perception:  Inattention/Neglect: Appears intact  Initiation: Appears intact  Motor Planning: Appears intact  Coordination:  Movements are Fluid and Coordinated: Yes  Finger to Nose: Intact  Finger to Target: Intact   Hand Function:  Hand Function  Gross Grasp: Functional (bilat. UEs)  Coordination: Functional (bilat. UEs)  Extremities: RUE   RUE : Within Functional Limits and LUE   LUE: Within Functional Limits    Outcome Measures: [facility] Daily Activity  Putting on  and taking off regular lower body clothing: None  Bathing (including washing, rinsing, drying): None  Putting on and taking off regular upper body clothing: None  Toileting, which includes using toilet, bedpan or urinal: None  Taking care of personal grooming such as brushing teeth: None  Eating Meals: None  Daily Activity - Total Score: 24

## 2024-05-07 NOTE — H&P
History Of Present Illness  Jerardo Olmedo is a 49 y.o. male with no reported past medical history who presented to the emergency department as a brain attack with concern for ischemic stroke with last known well 2115.  Patient states symptoms started with sudden onset right-sided weakness, inability to walk or stand without falling over, pins and needle sensation in his right arm.  Patient states he developed a dull frontal headache after symptoms started.  Denies history of migraines, head trauma, personal or family history of berry aneurysms or connective tissue disorders.  Patient's initial NIH in the emergency department with a 7 given partial gaze palsy, minor flattening of the nasolabial fold, drift in the right right arm and leg, decreased sensation in the right extremity and limb ataxia in the right arm and leg. initial head CT showed no evidence of infarct or hemorrhage so patient was given TNK.  While in the ED patient started become bradycardic in the 40s and repeat head CT scan was ordered due to concern for hemorrhage status post TNK which showed no evidence of acute hemorrhage.  CT angio showed thrombus with marked narrowing of V4 segment of the right vertebral artery.  Patient is not a candidate for mechanical thrombectomy per ED staff.  Patient was given Reglan and Benadryl in the ED which patient states has improved his nausea and headache and reports only a mild throbbing headache at this time.  States all of his other symptoms including dizziness, extremity weakness, pins and needle sensation in his right arm have resolved.  Patient admitted to the ICU for close observation including every hour neurochecks status post TNK administration.     Past Medical History  No past medical history on file.    Surgical History  No past surgical history on file.     Social History  He has no history on file for tobacco use, alcohol use, and drug use.    Family History  No family history on file.      Allergies  Patient has no known allergies.    Review of Systems     Physical Exam  Vitals and nursing note reviewed.   Constitutional:       General: He is not in acute distress.     Appearance: Normal appearance. He is not ill-appearing, toxic-appearing or diaphoretic.   HENT:      Head: Normocephalic and atraumatic.      Right Ear: External ear normal.      Left Ear: External ear normal.      Nose: Nose normal.   Eyes:      Extraocular Movements: Extraocular movements intact.      Pupils: Pupils are equal, round, and reactive to light.   Neck:      Vascular: No carotid bruit.   Cardiovascular:      Rate and Rhythm: Normal rate and regular rhythm.      Pulses: Normal pulses.      Heart sounds: Normal heart sounds.   Pulmonary:      Effort: Pulmonary effort is normal.      Breath sounds: Normal breath sounds.   Abdominal:      General: Abdomen is flat.      Palpations: Abdomen is soft.   Musculoskeletal:         General: Normal range of motion.      Cervical back: Normal range of motion and neck supple. No rigidity or tenderness.   Lymphadenopathy:      Cervical: No cervical adenopathy.   Skin:     General: Skin is warm and dry.      Capillary Refill: Capillary refill takes less than 2 seconds.   Neurological:      Mental Status: He is alert.      Comments: Patient has leftward beating nystagmus but otherwise cranial nerves II through XII intact with an NIH of 0.  Ambulation to check for gait ataxia deferred given TNK administration.   Psychiatric:         Mood and Affect: Mood normal.         Behavior: Behavior normal.          Last Recorded Vitals  Blood pressure 132/75, pulse (!) 44, temperature 36 °C (96.8 °F), temperature source Temporal, resp. rate 22, height 1.829 m (6'), weight 84.2 kg (185 lb 10 oz), SpO2 97%.    Relevant Results        Current Facility-Administered Medications:     calcium gluconate in NS IV 2 g, 2 g, intravenous, q6h PRN, Koffi Carreno DO    hydrALAZINE (Apresoline) injection 5  mg, 5 mg, intravenous, q4h PRN, Orener Zitaacek, DO    magnesium sulfate IV 4 g, 4 g, intravenous, q6h PRN, Christopher Horacek, DO    potassium chloride CR (Klor-Con M20) ER tablet 40 mEq, 40 mEq, oral, q6h PRN **OR** potassium chloride (Klor-Con) packet 40 mEq, 40 mEq, oral, q6h PRN, Orener Zitaacek, DO    potassium chloride 20 mEq in 100 mL IV premix, 20 mEq, intravenous, q6h PRN, Orener Horacek, DO     Results for orders placed or performed during the hospital encounter of 05/06/24 (from the past 24 hour(s))   POCT GLUCOSE   Result Value Ref Range    POCT Glucose 114 (H) 74 - 99 mg/dL   CBC and Auto Differential   Result Value Ref Range    WBC 9.3 4.4 - 11.3 x10*3/uL    nRBC 0.2 (H) 0.0 - 0.0 /100 WBCs    RBC 5.12 4.50 - 5.90 x10*6/uL    Hemoglobin 14.3 13.5 - 17.5 g/dL    Hematocrit 44.0 41.0 - 52.0 %    MCV 86 80 - 100 fL    MCH 27.9 26.0 - 34.0 pg    MCHC 32.5 32.0 - 36.0 g/dL    RDW 13.0 11.5 - 14.5 %    Platelets 238 150 - 450 x10*3/uL    Neutrophils % 22.5 40.0 - 80.0 %    Immature Granulocytes %, Automated 0.1 0.0 - 0.9 %    Lymphocytes % 64.2 13.0 - 44.0 %    Monocytes % 7.3 2.0 - 10.0 %    Eosinophils % 5.3 0.0 - 6.0 %    Basophils % 0.6 0.0 - 2.0 %    Neutrophils Absolute 2.09 1.20 - 7.70 x10*3/uL    Immature Granulocytes Absolute, Automated 0.01 0.00 - 0.70 x10*3/uL    Lymphocytes Absolute 5.98 (H) 1.20 - 4.80 x10*3/uL    Monocytes Absolute 0.68 0.10 - 1.00 x10*3/uL    Eosinophils Absolute 0.49 0.00 - 0.70 x10*3/uL    Basophils Absolute 0.06 0.00 - 0.10 x10*3/uL   Comprehensive metabolic panel   Result Value Ref Range    Glucose 111 (H) 74 - 99 mg/dL    Sodium 138 136 - 145 mmol/L    Potassium 3.7 3.5 - 5.3 mmol/L    Chloride 103 98 - 107 mmol/L    Bicarbonate 26 21 - 32 mmol/L    Anion Gap 13 10 - 20 mmol/L    Urea Nitrogen 22 6 - 23 mg/dL    Creatinine 1.27 0.50 - 1.30 mg/dL    eGFR 70 >60 mL/min/1.73m*2    Calcium 9.9 8.6 - 10.3 mg/dL    Albumin 4.7 3.4 - 5.0 g/dL    Alkaline  Phosphatase 65 33 - 120 U/L    Total Protein 6.6 6.4 - 8.2 g/dL    AST 28 9 - 39 U/L    Bilirubin, Total 0.5 0.0 - 1.2 mg/dL    ALT 32 10 - 52 U/L   Troponin I, High Sensitivity   Result Value Ref Range    Troponin I, High Sensitivity <3 0 - 20 ng/L   Protime-INR   Result Value Ref Range    Protime 12.6 9.8 - 12.8 seconds    INR 1.1 0.9 - 1.1   APTT   Result Value Ref Range    aPTT 28 27 - 38 seconds      CT head wo IV contrast    Result Date: 5/6/2024  Interpreted By:  Nazario Mtz, STUDY: CT HEAD WO IV CONTRAST;  5/6/2024 11:19 pm   INDICATION: Signs/Symptoms:Headache post tnk.   COMPARISON: Same-day CT   ACCESSION NUMBER(S): [id number]   ORDERING CLINICIAN: JO-ANN MCKINLEY   TECHNIQUE: Axial noncontrast CT images of the head. Sagittal and coronal reformats were provided.   FINDINGS: BRAIN: No acute intracranial hemorrhage. No mass effect or midline shift. Gray-white matter interfaces are preserved.   VENTRICLES and EXTRA-AXIAL SPACES: Normal.   EXTRACRANIAL SOFT TISSUES:  Within normal limits.   PARANASAL SINUSES/MASTOIDS: The visualized paranasal sinuses and mastoid air cells are aerated.   BONES AND ORBITS: No displaced skull fracture. Orbits are within normal limits.   OTHER FINDINGS: None.       1. No acute intracranial hemorrhage or mass effect.   Signed by: Nazario Mtz 5/6/2024 11:46 PM Dictation workstation:   [id number]    CT brain attack angio head and neck W and WO IV contrast    Result Date: 5/6/2024  Interpreted By:  Wilder Marion, STUDY: CT BRAIN ATTACK ANGIO HEAD AND NECK W AND WO IV CONTRAST;  5/6/2024 10:55 pm   INDICATION: Signs/Symptoms:NIHSS 6.   COMPARISON: None   ACCESSION NUMBER(S): [id number]   ORDERING CLINICIAN: JO-ANN MCKINLEY   TECHNIQUE: Contiguous axial images of the head and neck were obtained after the intravenous administration of contrast. Coronal and sagittal reformatted images were obtained of the axial images. MIPS and 3D reformatted images were also performed  and reviewed.   FINDINGS: The bilateral anterior cerebral arteries, middle cerebral arteries, and posterior cerebral arteries are grossly patent.   The bilateral common carotid internal carotid artery is grossly patent.   There is segment of thrombus with marked narrowing of the V4 segment of the right vertebral artery. The left vertebral artery is grossly patent.       Thrombus with marked narrowing of V4 segment of right vertebral artery.   MACRO: Wilder Marion discussed the significance and urgency of this critical finding by telephone with the emergency room physician at [facility] on 5/6/2024 at 11:23 pm.  (**-RCF-**) Findings:  See findings.   Signed by: Wilder Marion 5/6/2024 11:26 PM Dictation workstation:   [id number]    CT brain attack head wo IV contrast    Result Date: 5/6/2024  Interpreted By:  Nazario Mtz, STUDY: CT BRAIN ATTACK HEAD WO IV CONTRAST;  5/6/2024 10:16 pm   INDICATION: Signs/Symptoms:RUE + RLE weakness, R sided headache.   COMPARISON: None.   ACCESSION NUMBER(S): [id number]   ORDERING CLINICIAN: JO-ANN MCKINLEY   TECHNIQUE: Axial noncontrast CT images of the head. Sagittal and coronal reformats were provided.   FINDINGS: BRAIN: No acute intracranial hemorrhage. No mass effect or midline shift. Gray-white matter interfaces are preserved.   VENTRICLES and EXTRA-AXIAL SPACES: Normal.   EXTRACRANIAL SOFT TISSUES:  Within normal limits.   PARANASAL SINUSES/MASTOIDS: The visualized paranasal sinuses and mastoid air cells are aerated.   BONES AND ORBITS: No displaced skull fracture. Orbits are within normal limits.   OTHER FINDINGS: None.       1. No acute intracranial hemorrhage or mass effect.   MACRO: Nazaroi Mtz discussed the significance and urgency of this critical finding by telephone with  Dr. Justin Carrizales on 5/6/2024 at 10:21 pm.  (**-RCF-**) Findings:  See findings.   Signed by: Nazario Mtz 5/6/2024 10:24 PM Dictation workstation:   [id number]        Assessment/Plan   Principal Problem:    Stroke aborted by administration of thrombolytic agent (Multi)  Active Problems:    Bradycardia      Plan  Neuro:  -q1 hr neurochecks  -CAM ICU assessment and ABCDEF bundle  -PT/OT  -neurology on consult appreciate recs  -PRN hydralazine with goal BP <185/110    CV:  -Continuous cardiac telemetry and Q1 vitals  -EKG: Normal sinus rhythm with a ventricular rate of 65 bpm,  ms, QRS 96 ms, QTc 459 ms  -Troponin: <3    Pulm:  -No acute issues  -Maintain pulse ox > 92%    :  -BUN/Cr: 22/1.27  -Monitor daily BMP  -Strict I/Os  -Monitor and replace electrolytes per protocol    GI:  -NPO  -GI prophylaxis: not indicated  -Bowel regimen: Colace    Endo:  -Accuchecks Q4  -Hypoglycemia protocol    Heme:  -H/H: 14.3/44.0  -Monitor daily CBC  -DVT Prophylaxis: SCDs    ID:  -afebrile, no leukocytosis  -Monitor for s/s of infection  -No indication for antimicrobial therapy at this time    Dispo: Admit to ICU for q1 hr neurochecks             Koffi Carreno DO

## 2024-05-07 NOTE — ED PROVIDER NOTES
HPI   Chief Complaint   Patient presents with    Stroke       Is a 49-year-old male otherwise healthy presents the ED brought as a stroke alert.  His last known well was 9:15 PM.  He reportedly bent over, and when he stood up, had significant dizziness along with right-sided weakness and some headache.  He was in his normal state of health prior to this, he is never previous had history of similar presentations.                          Janak Coma Scale Score: 15         NIH Stroke Scale: 0             Patient History   No past medical history on file.  No past surgical history on file.  No family history on file.  Social History     Tobacco Use    Smoking status: Not on file    Smokeless tobacco: Not on file   Substance Use Topics    Alcohol use: Not on file    Drug use: Not on file       Physical Exam   ED Triage Vitals   Temp Pulse Resp BP   -- -- -- --      SpO2 Temp src Heart Rate Source Patient Position   -- -- -- --      BP Location FiO2 (%)     -- --       Physical Exam  Constitutional:       Appearance: Normal appearance.   HENT:      Head: Normocephalic and atraumatic.      Mouth/Throat:      Mouth: Mucous membranes are moist.   Eyes:      Extraocular Movements: Extraocular movements intact.   Cardiovascular:      Rate and Rhythm: Normal rate and regular rhythm.      Heart sounds: Normal heart sounds. No murmur heard.  Pulmonary:      Effort: Pulmonary effort is normal. No respiratory distress.      Breath sounds: Normal breath sounds. No wheezing.   Abdominal:      General: There is no distension.      Palpations: Abdomen is soft.      Tenderness: There is no abdominal tenderness. There is no guarding.   Musculoskeletal:      Right lower leg: No edema.      Left lower leg: No edema.   Skin:     General: Skin is warm and dry.   Neurological:      Mental Status: He is alert and oriented to person, place, and time.      Cranial Nerves: Cranial nerve deficit present.      Motor: Weakness present.    Psychiatric:         Mood and Affect: Mood normal.         Behavior: Behavior normal.         ED Course & MDM   ED Course as of 05/07/24 0328   Mon May 06, 2024   2227 Spoke with pharmacy regarding TNK, pharmacy to bring down TNK. Pharmacy was not aware that patient was TNK candidate. Dr Redding reports patient is TNK candidate. No bleed per radiology [VH]    Pharmacy arrived at bedside with TNK [VH]      ED Course User Index  [VH] Justin Carrizales DO         Diagnoses as of 24   Stroke aborted by administration of thrombolytic agent (Multi)   Acute nonintractable headache, unspecified headache type       Medical Decision Making  History/Exam limitations: none.   Additional history was obtained from patient.      Last Known Well Time: 915 PM        ------------------------------------------------------------------------------------------------------------------------------------------           Interval: Baseline  Time: 10:19 PM  Person Administering Scale: Justin Carrizales DO     1a  Level of consciousness: 0=alert; keenly responsive  1b. LOC questions:  0=Performs both tasks correctly  1c. LOC commands: 0=Performs both tasks correctly  2.  Best Gaze: 1=partial gaze palsy  3. Visual: 0=No visual loss  4. Facial Palsy: 1=Minor paralysis (flattened nasolabial fold, asymmetric on smiling)  5a. Motor left arm: 0=No drift, limb holds 90 (or 45) degrees for full 10 seconds  5b.  Motor right arm: 1=Drift, limb holds 90 (or 45) degrees but drifts down before full 10 seconds: does not hit bed  6a. motor left le=No drift, limb holds 90 (or 45) degrees for full 10 seconds  6b  Motor right le=Drift, limb holds 90 (or 45) degrees but drifts down before full 10 seconds: does not hit bed  7. Limb Ataxia: 2=Present in two limbs  8.  Sensory: 1=Mild to moderate sensory loss; patient feels pinprick is less sharp or is dull on the affected side; there is a loss of superficial pain with pinprick but patient is  aware He is being touched  9. Best Language:  0=No aphasia, normal  10. Dysarthria: 0=Normal  11. Extinction and Inattention: 0=No abnormality  12. Distal motor function: 0=Normal    Total:   7        VAN: VAN: Negative     ------------------------------------------------------------------------------------------------------------------------------------------     Medical Decision Making: Patient taken straight to CT scanner after arrival, interpretation of CT head no acute intracranial hemorrhage noted, NIH remains elevated.  At this time, the patient is TNK candidate, this was discussed with neurology and radiologist, neurology is in agreement with TNK administration.    Patient consented for TNK, no contraindications noted.  TNK administered at the documented time.  Sent to CT angiogram of head and neck brain attack protocol due to NIH greater than 6.    Received call from radiologist stating that there is a right-sided vertebral artery occlusion of the V4 branch.  I subsequently called  transfer center and discussed case with Dr. Cho, stroke neurologist, who informs me that the patient is not a candidate for mechanical thrombectomy.  At this time, patient is appropriate for ICU admission here at [facility].      During his stay in the ED, the patient complained of significantly worsening headache after TNK and had intermittent episodes of bradycardia, which raise my concern for increased intracranial pressure possibly from hemorrhagic conversion.    I did reorder CT scan of the head stat, and it did not demonstrate any acute intracranial hemorrhage or mass effect    Patient is admitted and transferred to ICU    The case was subsequently discussed with Dr. Finley, on-call intensivist, who has accepted the patient to ICU.          EKG interpreted by myself: Normal sinus rhythm, normal axis, QTc 459 ms, no acute injury pattern noted.     Independent Interpretation of Studies: I independently interpreted the CT  head and see No obvious evidence of intracranial hemorrhage    Chronic Medical Conditions Significantly Affecting Care: None    Social Determinants of Health Significantly Affecting Care: None     External Records Reviewed: I reviewed recent and relevant outside records including: None     Discussion of Management with Other Providers:   I discussed the patient/results with: Dr Redding neurology and the admitting team      IV Thrombolysis:    Yes administration time 2238 [unfilled]Were there delays to thrombolysis administration?: Yes initial refusal of IV Thrombolysis therapy by patient/family, pharmacy delays in bringing TNK down to ED      Amount and/or Complexity of Data Reviewed  Independent Historian: spouse  External Data Reviewed: labs and radiology.  Radiology: independent interpretation performed. Decision-making details documented in ED Course.        Procedure  Critical Care    Performed by: Justin Carrizales DO  Authorized by: Negrito Sahu DO    Critical care provider statement:     Critical care time (minutes):  57    Critical care time was exclusive of:  Separately billable procedures and treating other patients    Critical care was necessary to treat or prevent imminent or life-threatening deterioration of the following conditions:  CNS failure or compromise    Critical care was time spent personally by me on the following activities:  Development of treatment plan with patient or surrogate, ordering and performing treatments and interventions, ordering and review of laboratory studies, ordering and review of radiographic studies, pulse oximetry, re-evaluation of patient's condition, review of old charts, examination of patient, obtaining history from patient or surrogate and discussions with primary provider    Care discussed with: admitting provider         Justin Carrizales DO  Resident  05/07/24 [id number]

## 2024-05-07 NOTE — CONSULTS
"Inpatient consult to Neurology  Consult performed by: Billie Doe, CATRINA-CNP  Consult ordered by: Amy Angelo MD          History Of Present Illness  Jerardo Olmedo is a 49 y.o. male presenting with stroke symptoms. Pt says he bent down to  the TV remote and when he stood back up, his right side was weak and numb and he was \"staggering all over the place.\" He was unable to stand or walk. He called his wife down, who called 911. In the ED, his NIH was 7 for partial gaze palsy, minor NLF flattening, drift of the right arm and leg, decreased sensation on the right side, and limb ataxia of the right arm and leg. Initial CT head was unremarkable and he was given TNK at 2230. CT angio showed thrombus with marked narrowing of V4 segment of the right vertebral artery. This was discussed with Dr Ma at [facility] who said pt is not a candidate for  thrombectomy. Pt was given Reglan and Benadryl in the ED for headache and nausea which provided some relief. He was admitted to ICU for further management. Evaluated pt at bedside. He reports a dull frontal headache, rating 3 out of 10 on pain scale with no other associated symptoms. Pt's wife states that his symptoms mostly resolved after the TNK was given last night. He denies dizziness, confusion, vision or speech changes, limb weakness, or sensory changes.     Last known well: 2115 on 05/06/2024  Had stroke symptoms resolved at time of presentation: No    Past Medical History  No past medical history on file.  Surgical History  No past surgical history on file.  Social History     Allergies  Patient has no known allergies.  Home Medications  Medications Prior to Admission   Medication Sig Dispense Refill Last Dose    multivitamin with minerals tablet Take 1 tablet by mouth once daily.   5/6/2024 at AM       Review of Systems  ROS: 12 systems reviewed and negative except per HPI above    Neurological Exam  Physical Exam  Mental Status: " Lethargic. Oriented to person, place and time. Speech was fluent to history. Naming, repetition and comprehension were intact.   CN: (CN2)-  VFF. (CN 2,3) PERRL. (CN 3,4,6) EOMI. (CN 5)Facial sensation was intact to light touch bilaterally. (CN 7)Facial expression was symmetric (CN 12) Tongue protruded midline.   Motor: Normal muscle bulk and tone. Strength (confrontation testing) was (R/L) 5/5 shoulder abduction, elbow flexion/extension,  strenght, hip flexion, knee flexion/extension, ankle dorsi- and plantar flexion. There were no abnormal movements. Sensory: Intact to light touch in all 4 extremities. Coordination (cerebellar function): Finger to nose and heel to shin were intact with no dysmetria.   Gait: deferred.    Last Recorded Vitals  Blood pressure 152/80, pulse 60, temperature 36.2 °C (97.2 °F), resp. rate 17, height 1.829 m (6'), weight 84.2 kg (185 lb 10 oz), SpO2 99%.        Relevant Results  Scheduled medications  docusate sodium, 100 mg, oral, BID      Continuous medications     PRN medications  PRN medications: calcium gluconate, hydrALAZINE, magnesium sulfate, potassium chloride CR **OR** potassium chloride, potassium chloride  Results for orders placed or performed during the hospital encounter of 05/06/24 (from the past 24 hour(s))   POCT GLUCOSE   Result Value Ref Range    POCT Glucose 114 (H) 74 - 99 mg/dL   CBC and Auto Differential   Result Value Ref Range    WBC 9.3 4.4 - 11.3 x10*3/uL    nRBC 0.2 (H) 0.0 - 0.0 /100 WBCs    RBC 5.12 4.50 - 5.90 x10*6/uL    Hemoglobin 14.3 13.5 - 17.5 g/dL    Hematocrit 44.0 41.0 - 52.0 %    MCV 86 80 - 100 fL    MCH 27.9 26.0 - 34.0 pg    MCHC 32.5 32.0 - 36.0 g/dL    RDW 13.0 11.5 - 14.5 %    Platelets 238 150 - 450 x10*3/uL    Neutrophils % 22.5 40.0 - 80.0 %    Immature Granulocytes %, Automated 0.1 0.0 - 0.9 %    Lymphocytes % 64.2 13.0 - 44.0 %    Monocytes % 7.3 2.0 - 10.0 %    Eosinophils % 5.3 0.0 - 6.0 %    Basophils % 0.6 0.0 - 2.0 %     Neutrophils Absolute 2.09 1.20 - 7.70 x10*3/uL    Immature Granulocytes Absolute, Automated 0.01 0.00 - 0.70 x10*3/uL    Lymphocytes Absolute 5.98 (H) 1.20 - 4.80 x10*3/uL    Monocytes Absolute 0.68 0.10 - 1.00 x10*3/uL    Eosinophils Absolute 0.49 0.00 - 0.70 x10*3/uL    Basophils Absolute 0.06 0.00 - 0.10 x10*3/uL   Comprehensive metabolic panel   Result Value Ref Range    Glucose 111 (H) 74 - 99 mg/dL    Sodium 138 136 - 145 mmol/L    Potassium 3.7 3.5 - 5.3 mmol/L    Chloride 103 98 - 107 mmol/L    Bicarbonate 26 21 - 32 mmol/L    Anion Gap 13 10 - 20 mmol/L    Urea Nitrogen 22 6 - 23 mg/dL    Creatinine 1.27 0.50 - 1.30 mg/dL    eGFR 70 >60 mL/min/1.73m*2    Calcium 9.9 8.6 - 10.3 mg/dL    Albumin 4.7 3.4 - 5.0 g/dL    Alkaline Phosphatase 65 33 - 120 U/L    Total Protein 6.6 6.4 - 8.2 g/dL    AST 28 9 - 39 U/L    Bilirubin, Total 0.5 0.0 - 1.2 mg/dL    ALT 32 10 - 52 U/L   Troponin I, High Sensitivity   Result Value Ref Range    Troponin I, High Sensitivity <3 0 - 20 ng/L   Protime-INR   Result Value Ref Range    Protime 12.6 9.8 - 12.8 seconds    INR 1.1 0.9 - 1.1   APTT   Result Value Ref Range    aPTT 28 27 - 38 seconds         NIH Stroke Scale  1A. Level of Consciousness: Alert, Keenly Responsive  1B. Ask Month and Age: Both Questions Right  1C. Blink Eyes & Squeeze Hands: Performs Both Tasks  2. Best Gaze: Normal  3. Visual: No Visual Loss  4. Facial Palsy: Normal Symmetrical Movements  5A. Motor - Left Arm: No Drift  5B. Motor - Right Arm: No Drift  6A. Motor - Left Leg: No Drift  6B. Motor - Right Leg: No Drift  7. Limb Ataxia: Absent  8. Sensory Loss: Normal  9. Best Language: No Aphasia  10. Dysarthria: Normal  11. Extinction and Inattention: No Abnormality  NIH Stroke Scale: 0             No MRI head results found for the past 14 days  CT head wo IV contrast    Result Date: 5/6/2024  Interpreted By:  Nazario Mtz, STUDY: CT HEAD WO IV CONTRAST;  5/6/2024 11:19 pm   INDICATION:  "Signs/Symptoms:Headache post tnk.   COMPARISON: Same-day CT   ACCESSION NUMBER(S): [id number]   ORDERING CLINICIAN: JO-ANN MCKINLEY   TECHNIQUE: Axial noncontrast CT images of the head. Sagittal and coronal reformats were provided.   FINDINGS: BRAIN: No acute intracranial hemorrhage. No mass effect or midline shift. Gray-white matter interfaces are preserved.   VENTRICLES and EXTRA-AXIAL SPACES: Normal.   EXTRACRANIAL SOFT TISSUES:  Within normal limits.   PARANASAL SINUSES/MASTOIDS: The visualized paranasal sinuses and mastoid air cells are aerated.   BONES AND ORBITS: No displaced skull fracture. Orbits are within normal limits.   OTHER FINDINGS: None.       1. No acute intracranial hemorrhage or mass effect.   Signed by: Nazario Mtz 5/6/2024 11:46 PM Dictation workstation:   [id number]   No echocardiogram results found for the past 14 days        No results found for: \"BNP\"     I have personally reviewed the following imaging results CT head wo IV contrast    MR brain wo IV contrast    Addendum Date: 5/7/2024    Interpreted By:  Hector Will, ADDENDUM: Hector Will discussed the significance and urgency of this critical finding by telephone with  ACE VIDES on 5/7/2024 at 12:37 pm.  (**-RCF-**) Findings:  See findings.     Signed by: Hector Will 5/7/2024 12:37 PM   -------- ORIGINAL REPORT -------- Dictation workstation:   [id number]    Result Date: 5/7/2024  Interpreted By:  Hector Will, STUDY: MR BRAIN WO IV CONTRAST;  5/7/2024 12:24 pm   INDICATION: Signs/Symptoms:stroke.   COMPARISON: None.   ACCESSION NUMBER(S): [id number]   ORDERING CLINICIAN: ACE VIDES   TECHNIQUE: The brain was studied in the sagittal, axial and coronal planes utilizing FLAIR, T1 and T2 weighted images.   FINDINGS: There is a normal-size ventricular system.  There is no evidence of intracranial mass or extra-axial collection.  The skull base, paranasal sinuses and orbital structures are " unremarkable. Diffusion weighted images and associated ADC maps of the brain demonstrate restriction in the right cerebellar hemisphere consistent with acute infarction in the inferior hemisphere within the distribution of right PICA. No abnormal diffusion restriction within the medulla.. Gradient echo T2 weighted images fail to demonstrate hemosiderin deposition or other evidence of hemorrhage.       * Acute infarction in the inferior portion of the right cerebellar hemisphere without associated hemorrhage or mass effect. The brainstem is spared *No other evidence of intracranial mass, extra-axial collection or hemorrhage.   MACRO: none   Signed by: Hector Will 5/7/2024 12:30 PM Dictation workstation:   [id number]    ECG 12 lead    Result Date: 5/7/2024  Normal sinus rhythm Normal ECG No previous ECGs available    CT head wo IV contrast    Result Date: 5/6/2024  Interpreted By:  Nazario Mtz, STUDY: CT HEAD WO IV CONTRAST;  5/6/2024 11:19 pm   INDICATION: Signs/Symptoms:Headache post tnk.   COMPARISON: Same-day CT   ACCESSION NUMBER(S): [id number]   ORDERING CLINICIAN: JO-ANN MCKINLEY   TECHNIQUE: Axial noncontrast CT images of the head. Sagittal and coronal reformats were provided.   FINDINGS: BRAIN: No acute intracranial hemorrhage. No mass effect or midline shift. Gray-white matter interfaces are preserved.   VENTRICLES and EXTRA-AXIAL SPACES: Normal.   EXTRACRANIAL SOFT TISSUES:  Within normal limits.   PARANASAL SINUSES/MASTOIDS: The visualized paranasal sinuses and mastoid air cells are aerated.   BONES AND ORBITS: No displaced skull fracture. Orbits are within normal limits.   OTHER FINDINGS: None.       1. No acute intracranial hemorrhage or mass effect.   Signed by: Nazario Mtz 5/6/2024 11:46 PM Dictation workstation:   [id number]    CT brain attack angio head and neck W and WO IV contrast    Result Date: 5/6/2024  Interpreted By:  Wilder Marion, STUDY: CT BRAIN ATTACK ANGIO HEAD  AND NECK W AND WO IV CONTRAST;  5/6/2024 10:55 pm   INDICATION: Signs/Symptoms:NIHSS 6.   COMPARISON: None   ACCESSION NUMBER(S): [id number]   ORDERING CLINICIAN: JO-ANN MCKINLEY   TECHNIQUE: Contiguous axial images of the head and neck were obtained after the intravenous administration of contrast. Coronal and sagittal reformatted images were obtained of the axial images. MIPS and 3D reformatted images were also performed and reviewed.   FINDINGS: The bilateral anterior cerebral arteries, middle cerebral arteries, and posterior cerebral arteries are grossly patent.   The bilateral common carotid internal carotid artery is grossly patent.   There is segment of thrombus with marked narrowing of the V4 segment of the right vertebral artery. The left vertebral artery is grossly patent.       Thrombus with marked narrowing of V4 segment of right vertebral artery.   MACRO: Wilder Marion discussed the significance and urgency of this critical finding by telephone with the emergency room physician at [facility] on 5/6/2024 at 11:23 pm.  (**-RCF-**) Findings:  See findings.   Signed by: Wilder Marion 5/6/2024 11:26 PM Dictation workstation:   [id number]    CT brain attack head wo IV contrast    Result Date: 5/6/2024  Interpreted By:  Nazario Mtz, STUDY: CT BRAIN ATTACK HEAD WO IV CONTRAST;  5/6/2024 10:16 pm   INDICATION: Signs/Symptoms:RUE + RLE weakness, R sided headache.   COMPARISON: None.   ACCESSION NUMBER(S): [id number]   ORDERING CLINICIAN: JO-ANN MCKINLEY   TECHNIQUE: Axial noncontrast CT images of the head. Sagittal and coronal reformats were provided.   FINDINGS: BRAIN: No acute intracranial hemorrhage. No mass effect or midline shift. Gray-white matter interfaces are preserved.   VENTRICLES and EXTRA-AXIAL SPACES: Normal.   EXTRACRANIAL SOFT TISSUES:  Within normal limits.   PARANASAL SINUSES/MASTOIDS: The visualized paranasal sinuses and mastoid air cells are aerated.   BONES AND ORBITS: No  displaced skull fracture. Orbits are within normal limits.   OTHER FINDINGS: None.       1. No acute intracranial hemorrhage or mass effect.   MACRO: Nazario Mtz discussed the significance and urgency of this critical finding by telephone with  Dr. Justin Carrizales on 5/6/2024 at 10:21 pm.  (**-RCF-**) Findings:  See findings.   Signed by: Nazario Mtz 5/6/2024 10:24 PM Dictation workstation:   [id number]     Stroke Alert CT/MRI review: Actual date and time        IV Thrombolysis IV Thrombolysis Checklist             Assessment/Plan   Principal Problem:    Stroke aborted by administration of thrombolytic agent (Multi)  Active Problems:    Bradycardia    Acute ischemic stroke right PICA; thrombus right vertebral artery  No current risk factors for stroke: cryptogenic at this time.    Recommend:    Hold AP/AC for now    CT head without contrast at 2230 (24 hrs post TNK); CT angio head and neck at 2230- if thrombus still present, start Eliquis; otherwise, start Aspirin 81mg daily  Hypercoag work up- see orders.  Statin  Lipid panel, A1c  Telemetry  Neuro checks/vital signs per TNK protocol   PT/OT/ST  Discharge with holter monitor for 2 weeks  Follow up with Dr. Redding after discharge.    Case/plan discussed and pt seen with     Type: Ischemic stroke  Subtype/etiology: thrombus  Vessels involved: right vertebral artery  Neurological manifestations: see above, resolved other than headache  NIHSS (worst at presentation): 7   Diagnostic evaluation: MRI brain  Antiplatelet/antithrombotic plan for stroke prevention: TNK  VTE prophylaxis: hold until 24 hour post TNK  Vascular Risk Factor modification goals:  Blood pressure goals: avoid hypotension SBP <100 that could worsen cerebral perfusion, Ischemic stroke post-thrombolysis- BP < 180/105 mmHg for 24hr  Lipid Goals: education on healthy diet and statin therapy to maintain or achieve goal LDL-cholesterol < 70mg  Glucose Goals: early treatment of hyperglycemia to  goal glucose 140-180 mg/dl with long-term goal A1c < 7%   Smoking Cessation and Education  Assessment for Rehabilitation needs   Patient and family education on signs and symptoms of stroke, calling 911, healthy strategies for stroke prevention.         I spent 55 minutes in the professional and overall care of this patient.      Billie Doe, APRN-CNP

## 2024-05-07 NOTE — CARE PLAN
Problem: Pain  Goal: My pain/discomfort is manageable  Outcome: Progressing     Problem: Safety  Goal: Patient will be injury free during hospitalization  Outcome: Progressing  Goal: I will remain free of falls  Outcome: Progressing     Problem: Daily Care  Goal: Daily care needs are met  Outcome: Progressing     Problem: Psychosocial Needs  Goal: Demonstrates ability to cope with hospitalization/illness  Outcome: Progressing  Goal: Collaborate with me, my family, and caregiver to identify my specific goals  Outcome: Progressing     Problem: Discharge Barriers  Goal: My discharge needs are met  Outcome: Progressing     Problem: General Stroke  Goal: Demonstrate improvement in neurological exam throughout the shift  Outcome: Progressing  Goal: Maintain BP within ordered limits throughout shift  Outcome: Progressing  Goal: Participate in treatment (ie., meds, therapy) throughout shift  Outcome: Progressing  Goal: No symptoms of hemorrhage throughout shift  Outcome: Progressing   The patient's goals for the shift include  to be free of neuro checks and be ok    The clinical goals for the shift include pt will remain stable neurologically with negative neuro checks    Over the shift, the patient did  make progress toward the following goals Pt was seen by neuro and mri stated pt had possible clots back of  right  side cerebellum.  Pt diet was advanced to regular.  Pt was able to be seen by pt and ot and ok to walk.  Pt neuro checks are negative.  We continue to monitor. Pt remains on q 1 neuro checks until 10 pm

## 2024-05-08 LAB
ALBUMIN SERPL BCP-MCNC: 3.6 G/DL (ref 3.4–5)
ALP SERPL-CCNC: 54 U/L (ref 33–120)
ALT SERPL W P-5'-P-CCNC: 21 U/L (ref 10–52)
ANION GAP SERPL CALC-SCNC: 11 MMOL/L (ref 10–20)
AORTIC VALVE MEAN GRADIENT: 1.9 MMHG
AORTIC VALVE PEAK VELOCITY: 0.92 M/S
AST SERPL W P-5'-P-CCNC: 14 U/L (ref 9–39)
ATRIAL RATE: 65 BPM
AV PEAK GRADIENT: 3.4 MMHG
B2 GLYCOPROT1 IGA SER-ACNC: <0.6 U/ML
B2 GLYCOPROT1 IGG SER-ACNC: <1.4 U/ML
B2 GLYCOPROT1 IGM SER-ACNC: 2.9 U/ML
BILIRUB SERPL-MCNC: 0.5 MG/DL (ref 0–1.2)
BUN SERPL-MCNC: 11 MG/DL (ref 6–23)
CA-I BLD-SCNC: 1.21 MMOL/L (ref 1.1–1.33)
CALCIUM SERPL-MCNC: 8.9 MG/DL (ref 8.6–10.3)
CARDIOLIPIN IGA SERPL-ACNC: <0.5 APL U/ML
CARDIOLIPIN IGG SER IA-ACNC: <1.6 GPL U/ML
CARDIOLIPIN IGM SER IA-ACNC: 2.6 MPL U/ML
CHLORIDE SERPL-SCNC: 102 MMOL/L (ref 98–107)
CO2 SERPL-SCNC: 25 MMOL/L (ref 21–32)
CREAT SERPL-MCNC: 0.84 MG/DL (ref 0.5–1.3)
EGFRCR SERPLBLD CKD-EPI 2021: >90 ML/MIN/1.73M*2
EJECTION FRACTION APICAL 4 CHAMBER: 68.7
ERYTHROCYTE [DISTWIDTH] IN BLOOD BY AUTOMATED COUNT: 12.8 % (ref 11.5–14.5)
EST. AVERAGE GLUCOSE BLD GHB EST-MCNC: 114 MG/DL
GLUCOSE SERPL-MCNC: 99 MG/DL (ref 74–99)
HBA1C MFR BLD: 5.6 %
HCT VFR BLD AUTO: 40.8 % (ref 41–52)
HCYS SERPL-SCNC: 5.39 UMOL/L (ref 5–13.9)
HGB BLD-MCNC: 13.3 G/DL (ref 13.5–17.5)
LEFT VENTRICLE INTERNAL DIMENSION DIASTOLE: 4.7 CM (ref 3.5–6)
LV EJECTION FRACTION BIPLANE: 70 %
MAGNESIUM SERPL-MCNC: 1.79 MG/DL (ref 1.6–2.4)
MCH RBC QN AUTO: 28.2 PG (ref 26–34)
MCHC RBC AUTO-ENTMCNC: 32.6 G/DL (ref 32–36)
MCV RBC AUTO: 86 FL (ref 80–100)
MITRAL VALVE E/A RATIO: 1.44
MITRAL VALVE E/E' RATIO: 4.9
NRBC BLD-RTO: 0 /100 WBCS (ref 0–0)
P AXIS: 66 DEGREES
P OFFSET: 188 MS
P ONSET: 135 MS
PLATELET # BLD AUTO: 208 X10*3/UL (ref 150–450)
POTASSIUM SERPL-SCNC: 4.3 MMOL/L (ref 3.5–5.3)
PR INTERVAL: 158 MS
PROT SERPL-MCNC: 5.6 G/DL (ref 6.4–8.2)
PROT SERPL-MCNC: 6.2 G/DL (ref 6.4–8.2)
Q ONSET: 214 MS
QRS COUNT: 11 BEATS
QRS DURATION: 96 MS
QT INTERVAL: 442 MS
QTC CALCULATION(BAZETT): 459 MS
QTC FREDERICIA: 453 MS
R AXIS: 68 DEGREES
RBC # BLD AUTO: 4.72 X10*6/UL (ref 4.5–5.9)
RIGHT VENTRICLE PEAK SYSTOLIC PRESSURE: 10.2 MMHG
SODIUM SERPL-SCNC: 134 MMOL/L (ref 136–145)
T AXIS: 55 DEGREES
T OFFSET: 435 MS
VENTRICULAR RATE: 65 BPM
WBC # BLD AUTO: 6.8 X10*3/UL (ref 4.4–11.3)

## 2024-05-08 PROCEDURE — 80053 COMPREHEN METABOLIC PANEL: CPT

## 2024-05-08 PROCEDURE — 85027 COMPLETE CBC AUTOMATED: CPT

## 2024-05-08 PROCEDURE — 36415 COLL VENOUS BLD VENIPUNCTURE: CPT

## 2024-05-08 PROCEDURE — 2500000001 HC RX 250 WO HCPCS SELF ADMINISTERED DRUGS (ALT 637 FOR MEDICARE OP): Performed by: PHYSICIAN ASSISTANT

## 2024-05-08 PROCEDURE — 83735 ASSAY OF MAGNESIUM: CPT

## 2024-05-08 PROCEDURE — 99232 SBSQ HOSP IP/OBS MODERATE 35: CPT | Performed by: NURSE PRACTITIONER

## 2024-05-08 PROCEDURE — 82330 ASSAY OF CALCIUM: CPT

## 2024-05-08 PROCEDURE — 99233 SBSQ HOSP IP/OBS HIGH 50: CPT

## 2024-05-08 PROCEDURE — 1200000002 HC GENERAL ROOM WITH TELEMETRY DAILY

## 2024-05-08 PROCEDURE — 2500000001 HC RX 250 WO HCPCS SELF ADMINISTERED DRUGS (ALT 637 FOR MEDICARE OP): Performed by: NURSE PRACTITIONER

## 2024-05-08 RX ORDER — NAPROXEN SODIUM 220 MG/1
81 TABLET, FILM COATED ORAL DAILY
Status: DISCONTINUED | OUTPATIENT
Start: 2024-05-08 | End: 2024-05-09 | Stop reason: HOSPADM

## 2024-05-08 RX ADMIN — ASPIRIN 81 MG 81 MG: 81 TABLET ORAL at 09:32

## 2024-05-08 RX ADMIN — DOCUSATE SODIUM 100 MG: 100 CAPSULE, LIQUID FILLED ORAL at 21:00

## 2024-05-08 RX ADMIN — ATORVASTATIN CALCIUM 80 MG: 80 TABLET, FILM COATED ORAL at 21:00

## 2024-05-08 ASSESSMENT — COGNITIVE AND FUNCTIONAL STATUS - GENERAL: MOBILITY SCORE: 24

## 2024-05-08 ASSESSMENT — PAIN SCALES - GENERAL: PAINLEVEL_OUTOF10: 0 - NO PAIN

## 2024-05-08 ASSESSMENT — PAIN - FUNCTIONAL ASSESSMENT: PAIN_FUNCTIONAL_ASSESSMENT: 0-10

## 2024-05-08 NOTE — PROGRESS NOTES
05/08/24 1054   Discharge Planning   Home or Post Acute Services None   Patient expects to be discharged to: home     No further rehab needs per therapy team. Patient to return home at discharge.

## 2024-05-08 NOTE — PROGRESS NOTES
Physical Therapy    Physical Therapy Evaluation    Patient Name: Jerardo Olmedo  MRN: [id number]  Today's Date: 5/7/2024   Time Calculation  Start Time: 1237  Stop Time: 1253  Time Calculation (min): 16 min    Assessment/Plan   PT Assessment  Evaluation/Treatment Tolerance: Patient tolerated treatment well  Medical Staff Made Aware: Yes  End of Session Communication: Bedside nurse  Assessment Comment: Pt is currently at baseline level of function and does not require skilled physical therapy in the hospital. Pt is able to ambulate and transfer interdependently without any assistive devices. DC PT.     End of Session Patient Position: Bed, 3 rail up  IP OR SWING BED PT PLAN  Inpatient or Swing Bed: Inpatient  PT Plan  PT Plan: PT Eval only  PT Eval Only Reason: Safe to return home  PT Frequency: PT eval only  PT Discharge Recommendations: No PT needed after discharge, No further acute PT  PT Recommended Transfer Status: Independent  PT - OK to Discharge: Yes    Subjective     Current Problem:  Patient Active Problem List   Diagnosis    Stroke aborted by administration of thrombolytic agent (Multi)    Bradycardia       General Visit Information:  General  Reason for Referral: impaired mobility; Thrombus of right vertebral artery s/p TNK 2200 5/6; cleared to work with patient after 12 hour window and NIH score 0  Referred By: Ev  Past Medical History Relevant to Rehab: No significant PMH found  Co-Treatment: OT  Co-Treatment Reason: patinet safety  Prior to Session Communication: Bedside nurse  Patient Position Received: Bed, 3 rail up (wife and RN present)  General Comment: To ED after sudden onset right-sided weakness, inability to walk or stand without falling over, pins and needle sensation in his right arm, headache. TNK administered at 2230 on 5/6/24.     CT brain 5/6: No acute intracranial hemorrhage or mass effect. CTA head 5/6: Thrombus with marked narrowing of V4 segment of right vertebral artery. MRI  brain 5/7: Acute infarction in the inferior portion of the right cerebellar hemisphere without associated hemorrhage or mass effect. The brainstem is spared *No other evidence of intracranial mass,extra-axial collection or hemorrhage.    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)    Precautions:  Precautions  Medical Precautions: Fall precautions    Vital Signs:     Objective     Pain:  Pain Assessment  Pain Assessment: 0-10  Pain Score: 1  Pain Type: Acute pain  Pain Location: Head    Cognition:  Cognition  Orientation Level: Oriented X4    General Assessments:  General Observation  General Observation: All symptoms seem to have resolved at this time      Sensation  Sensation Comment: WFL     Perception  Inattention/Neglect: Appears intact  Initiation: Appears intact  Motor Planning: Appears intact  Coordination  Movements are Fluid and Coordinated: Yes  Finger to Nose: Intact  Heel to Shin: Intact  Finger to Target: Intact  Coordination Comment: negative screen  Postural Control  Postural Control: Within Functional Limits  Static Sitting Balance  Static Sitting-Comment/Number of Minutes: good  Dynamic Sitting Balance  Dynamic Sitting-Comments: good  Static Standing Balance  Static Standing-Comment/Number of Minutes: good  Dynamic Standing Balance  Dynamic Standing-Comments: good    Functional Assessments:     Bed  Mobility  Bed Mobility: Yes  Bed Mobility 1  Bed Mobility 1: Supine to sitting, Sitting to supine  Level of Assistance 1: Independent  Transfers  Transfer: Yes  Transfer 1  Technique 1: Sit to stand, Stand to sit  Transfer Level of Assistance 1: Independent  Ambulation/Gait Training  Ambulation/Gait Training Performed: Yes  Ambulation/Gait Training 1  Surface 1: Level tile  Device 1: No device  Assistance 1: Independent  Comments/Distance (ft) 1: 200          Extremity/Trunk Assessments:        RLE   RLE : Within Functional Limits  LLE   LLE : Within Functional Limits    Outcome Measures:  [facility] Basic Mobility  Turning from your back to your side while in a flat bed without using bedrails: None  Moving from lying on your back to sitting on the side of a flat bed without using bedrails: None  Moving to and from bed to chair (including a wheelchair): None  Standing up from a chair using your arms (e.g. wheelchair or bedside chair): None  To walk in hospital room: None  Climbing 3-5 steps with railing: None  Basic Mobility - Total Score: 24        Pt educated on risks of strokes, how to identify symptoms and when to seek help. Reinforced timing is important when seeking medical therapies for stroke.

## 2024-05-08 NOTE — PROGRESS NOTES
Critical Care Daily Progress        Subjective   Patient is a 49 y.o. male admitted on 5/6/2024 10:11 PM for ischemic stroke.    Overnight Events:   No acute events overnight. Patient doing well. Has occasional headaches treated with tylenol.       Scheduled Medications:   atorvastatin, 80 mg, oral, Nightly  docusate sodium, 100 mg, oral, BID  perflutren lipid microspheres, 0.5-10 mL of dilution, intravenous, Once in imaging  perflutren protein A microsphere, 0.5 mL, intravenous, Once in imaging  sulfur hexafluoride microsphr, 2 mL, intravenous, Once in imaging         Continuous Medications:         PRN Medications:   PRN medications: acetaminophen **OR** acetaminophen **OR** acetaminophen, calcium gluconate, hydrALAZINE, labetaloL, magnesium sulfate, oxygen, potassium chloride CR **OR** potassium chloride, potassium chloride    Objective   Vitals:  Temp  Min: 36.2 °C (97.2 °F)  Max: 36.9 °C (98.4 °F)  Pulse  Min: 46  Max: 68  BP  Min: 108/54  Max: 164/74  Resp  Min: 13  Max: 24  SpO2  Min: 93 %  Max: 100 %    Physical Exam:   General: Well developed, well nourished, alert and cooperative, appears in no acute distress  Eyes: Non-injected conjunctiva, sclera clear  Cardiac: Extremities are warm and well perfused  Lungs: Breathing is easy, non-labored. Speaking in clear and complete sentences.   MSK: Ambulatory with steady gait, unassisted  Neuro: alert and oriented to person, place and time, CN II-XII intact, no limb ataxia, normal movement, no drift  Psych: Normal mood, normal affect.  Skin: no obvious lesions, no rashes.             Assessment/Plan     Neuro:   Ischemic stroke with TNK  -Ischemic stroke improved with TNK. Patient doing well. Repeat CT head and angio head/neck shows no acute vascular occlusion. Patient >24hrs post TNK. Downgrade to Dr. Uribe today.   -Pain: tylenol prn  -Sedation: none  -[facility] ICU    Cardiac:  - Continuous cardiac telemetry and Q1 vitals    - continue Statin  - Goals: MAP> 65, HR        Pulmonary:  No acute issues  Continuous pulse oximetry   O2 PRN to maintain SpO2 > 94%, wean as tolerated    Gastrointestinal:   No acute issues  - Diet: Normal  - Prophylaxis: not indicated  - Bowel regimen: colace as needed       Renal:   No acute issues  - Daily CMP,Mg,Phos  - monitor daily BMP  Net IO Since Admission: -1,830 mL [24 0821]  Results from last 72 hours   Lab Units 24  0656 24  2225   BUN mg/dL 11 22   CREATININE mg/dL 0.84 1.27       - Electrolytes: Replete per protocol, goal K>4 Phos >3 Mg >2        Endocrine:   No acute issues  -accuchecks q4hrs  Results from last 7 days   Lab Units 24  0656 24  1829 24  1117 24  2225 24  2220   POCT GLUCOSE mg/dL  --  97 108*  --  114*   GLUCOSE mg/dL 99  --   --  111*  --       -BG < 180 goal    Hematology:   No acute issues  - Daily CBC, coags  - DVT Prophylaxis: none  Results from last 7 days   Lab Units 24  0656 24  2225   WBC AUTO x10*3/uL 6.8 9.3   HEMOGLOBIN g/dL 13.3* 14.3   HEMATOCRIT % 40.8* 44.0   PLATELETS AUTO x10*3/uL 208 238         Infectious Disease:   No acute issues  Temp (24hrs), Av.6 °C (97.8 °F), Min:36.2 °C (97.2 °F), Max:36.9 °C (98.4 °F)         Musculoskeletal:   No acute issues  - PT to see   - OT to see     Integumentary:   No acute issues      CODE STATUS: Full Code    Disposition: Downgrade to regular medical floor.     Kayla Walter DO

## 2024-05-08 NOTE — CARE PLAN
The patient's goals for the shift include      The clinical goals for the shift include patient will remian neurologically stable throughout the shift  The pt is going to be monitored over night and plans to go home early tomorrow with holter monitor. Will continue to monitor.

## 2024-05-08 NOTE — PROGRESS NOTES
MRN: [id number]  SERVICE DATE:  5/8/2024   SERVICE TIME:  8:22 AM  Today's Date: 05/08/24  Admission Date: 5/6/2024  Hospital Day: #1    Subjective      49-year-old patient with brain attack patient was concern for ischemic stroke.  He was thrombolysed.  I have reviewed the case with the resident in ICU.        Objective      ROS:   General: Denies any change in weight, fever, chills, fatigue.   Head and Neck: Denies any headaches, syncope or history of head injury.   Eyes/Ears: Denies any cataracts, glaucoma, blurred vision, tinnitus.   Nose: Denies any epistaxis or sinus problems   Respiratory: Denies any cough, hemoptysis, wheezing, asthma, dyspnea.   Cardiovascular: No orthopnea, dyspnea, palpitations, congestive heart failure.   Gastrointestinal: Denies any indigestion, nausea, vomiting, diarrhea, constipation.   Neuro: No dizzyness, headache or syncope   ID: No fever or chills.   Endocrine: No weight loss or weight gain.  No thyroid or diabetic complaints     MEDICATIONS:  Current Facility-Administered Medications   Medication Dose Route Frequency Provider Last Rate Last Admin    acetaminophen (Tylenol) tablet 650 mg  650 mg oral q4h PRN Arturo Garnett DO   650 mg at 05/07/24 2247    Or    acetaminophen (Tylenol) oral liquid 650 mg  650 mg nasogastric tube q4h PRN Arturo Garnett DO   650 mg at 05/07/24 1035    Or    acetaminophen (Tylenol) suppository 650 mg  650 mg rectal q4h PRN Arturo Garnett DO        atorvastatin (Lipitor) tablet 80 mg  80 mg oral Nightly CHAD Cool   80 mg at 05/07/24 2043    calcium gluconate in NS IV 2 g  2 g intravenous q6h PRN Koffi Carreno DO        docusate sodium (Colace) capsule 100 mg  100 mg oral BID Koffi Carreno DO   100 mg at 05/07/24 2043    hydrALAZINE (Apresoline) injection 5 mg  5 mg intravenous q4h PRN Koffi Carreno DO        labetaloL (Normodyne,Trandate) injection 10 mg  10 mg intravenous q10 min PRN CHAD Cool         magnesium sulfate IV 4 g  4 g intravenous q6h PRN Dickopher Horacek, DO        oxygen (O2) therapy   inhalation Continuous PRN - O2/gases CHAD Cool        perflutren lipid microspheres (Definity) injection 0.5-10 mL of dilution  0.5-10 mL of dilution intravenous Once in imaging CHAD Cool        perflutren protein A microsphere (Optison) injection 0.5 mL  0.5 mL intravenous Once in imaging CHAD Cool        potassium chloride CR (Klor-Con M20) ER tablet 40 mEq  40 mEq oral q6h PRN Dickopher Horacek, DO        Or    potassium chloride (Klor-Con) packet 40 mEq  40 mEq oral q6h PRN Koffi Horacek, DO        potassium chloride 20 mEq in 100 mL IV premix  20 mEq intravenous q6h PRN Koffi Ahumadaacek, DO        sulfur hexafluoride microsphr (Lumason) injection 24.28 mg  2 mL intravenous Once in imaging CHAD Cool             PHYSICAL EXAM:   /68   Pulse 54   Temp 36.2 °C (97.2 °F)   Resp 24   Ht 1.829 m (6')   Wt 84.9 kg (187 lb 2.7 oz)   SpO2 97%   BMI 25.38 kg/m²      CONSTITUTIONAL - well nourished, well developed, looks like stated age, in no acute distress, not ill-appearing   SKIN - normal skin color and pigmentation, normal skin turgor without rash   HEAD - no trauma, normocephalic  EYES - pupils are equal and reactive to light, extraocular muscles are intact, and normal external exam  ENT - TM's intact, no injection, no signs of infection, uvula midline, normal tongue movement and throat normal  NECK - supple without rigidity, no neck mass was observed, no thyromegaly    CHEST - clear to auscultation, no wheezing, no crackles and no rales, good effort  CARDIAC - regular rate and regular rhythm, no skipped beats, no murmur  ABDOMEN - no organomegaly, soft, nontender, nondistended, normal bowel sounds   NEUROLOGICAL - normal gait, normal balance, normal motor, no ataxia, DTRs equal and symmetrical; alert, oriented and  no focal signs  PSYCHIATRIC - alert, pleasant and cordial, age-appropriate  IMMUNOLOGIC - no cervical lymphadenopathy       Labs:  Lab Results   Component Value Date    WBC 6.8 05/08/2024    HGB 13.3 (L) 05/08/2024    HCT 40.8 (L) 05/08/2024    MCV 86 05/08/2024     05/08/2024     Lab Results   Component Value Date    GLUCOSE 99 05/08/2024    CALCIUM 8.9 05/08/2024     (L) 05/08/2024    K 4.3 05/08/2024    CO2 25 05/08/2024     05/08/2024    BUN 11 05/08/2024    CREATININE 0.84 05/08/2024   ESR: --  Lab Results   Component Value Date    SEDRATE <1 05/07/2024     Lab Results   Component Value Date    CRP <0.10 05/07/2024     Lab Results   Component Value Date    ALT 21 05/08/2024    AST 14 05/08/2024    ALKPHOS 54 05/08/2024    BILITOT 0.5 05/08/2024                      Problem List Items Addressed This Visit             ICD-10-CM    * (Principal) Stroke aborted by administration of thrombolytic agent (Multi) - Primary I63.9    Relevant Orders    Critical Care (Completed)    Transthoracic Echo (TTE) Complete (Completed)     Other Visit Diagnoses         Codes    Acute nonintractable headache, unspecified headache type     R51.9        None symptomatic bradycardia  Stroke  Weakness    As needed hydralazine  PT OT  Neurochecks  Continuous cardiac monitoring  Check EKG and QT interval  PT OT eval and treat  Troponins reviewed  Hypoglycemia protocol  Okay to transfer once ICU signs of  Time spent taking care of this patient was in excess of 35 minutes.  Patient did have counseling about their medical conditions and current medication regimen.  This face-to-face encounter was done at [facility] on the above date.  More than half the time was spent on counseling and coordination of care.          Artemio Uribe MD  This note was created using [email or web address]. Any inadvertent grammar, spelling or syntax errors are do to voice recognition software error and are not intentional.  I  have reviewed the chart, and incorporated some of the notes entered by prior providers to this particular note , these were relied on to formulate my treatment plan as well as including more comprehensive review of the chart.

## 2024-05-08 NOTE — PROGRESS NOTES
Jerardo Olmedo is a 49 y.o. male on day 1 of admission presenting with Stroke aborted by administration of thrombolytic agent (Multi).      Subjective   No overnight events.  No new stroke symptoms.        Objective     Last Recorded Vitals  Blood pressure 117/68, pulse 60, temperature 36.7 °C (98.1 °F), temperature source Temporal, resp. rate 18, height 1.829 m (6'), weight 84.9 kg (187 lb 2.7 oz), SpO2 100%.    Physical Exam  Neurological Exam  Mental Status: Awake/alert. Oriented to person, place and time. Speech was fluent to history. Naming, repetition and comprehension were intact.   CN: (CN2)-  VFF. (CN 2,3) PERRL. (CN 3,4,6) EOMI. (CN 5)Facial sensation was intact to light touch bilaterally. (CN 7)Facial expression was symmetric (CN 12) Tongue protruded midline.   Motor: Normal muscle bulk and tone. Strength (confrontation testing) was (R/L) 5/5 shoulder abduction, elbow flexion/extension,  strenght, hip flexion, knee flexion/extension, ankle dorsi- and plantar flexion. There were no abnormal movements. Sensory: Intact to light touch in all 4 extremities. Coordination (cerebellar function): Finger to nose and heel to shin were intact with no dysmetria.   Gait: deferred.    Relevant Results  Scheduled medications  [Transfer Hold] aspirin, 81 mg, oral, Daily  [Transfer Hold] atorvastatin, 80 mg, oral, Nightly  [Transfer Hold] docusate sodium, 100 mg, oral, BID  [Transfer Hold] perflutren lipid microspheres, 0.5-10 mL of dilution, intravenous, Once in imaging  [Transfer Hold] perflutren protein A microsphere, 0.5 mL, intravenous, Once in imaging  [Transfer Hold] sulfur hexafluoride microsphr, 2 mL, intravenous, Once in imaging      Continuous medications     PRN medications  PRN medications: [Transfer Hold] acetaminophen **OR** [Transfer Hold] acetaminophen **OR** [Transfer Hold] acetaminophen, [Transfer Hold] calcium gluconate, [Transfer Hold] hydrALAZINE, [Transfer Hold] labetaloL, [Transfer Hold]  magnesium sulfate, oxygen, [Transfer Hold] potassium chloride CR **OR** [Transfer Hold] potassium chloride, [Transfer Hold] potassium chloride  Results for orders placed or performed during the hospital encounter of 05/06/24 (from the past 24 hour(s))   Cardiolipin antibody   Result Value Ref Range    Anticardiolipin IgA <0.5 <20.0 APL U/mL    Anticardiolipin IgG <1.6 <20.0 GPL U/mL    Anticardiolipin IgM 2.6 <20.0 MPL U/mL   Homocysteine   Result Value Ref Range    Homocysteine 5.39 5.00 - 13.90 umol/L   Sedimentation Rate   Result Value Ref Range    Sedimentation Rate <1 0 - 15 mm/h   Beta-2 glycoprotein antibodies   Result Value Ref Range    Beta-2 Glyco 1 IgG <1.4 <20.0 U/mL    Beta-2 Glyco 1 IgA <0.6 <20.0 U/mL    Beta 2 Glyco 1 IgM 2.9 <20.0 U/mL   C-reactive protein   Result Value Ref Range    C-Reactive Protein <0.10 <1.00 mg/dL   Protein, Total   Result Value Ref Range    Total Protein 6.2 (L) 6.4 - 8.2 g/dL   POCT GLUCOSE   Result Value Ref Range    POCT Glucose 97 74 - 99 mg/dL   CBC   Result Value Ref Range    WBC 6.8 4.4 - 11.3 x10*3/uL    nRBC 0.0 0.0 - 0.0 /100 WBCs    RBC 4.72 4.50 - 5.90 x10*6/uL    Hemoglobin 13.3 (L) 13.5 - 17.5 g/dL    Hematocrit 40.8 (L) 41.0 - 52.0 %    MCV 86 80 - 100 fL    MCH 28.2 26.0 - 34.0 pg    MCHC 32.6 32.0 - 36.0 g/dL    RDW 12.8 11.5 - 14.5 %    Platelets 208 150 - 450 x10*3/uL   Comprehensive metabolic panel   Result Value Ref Range    Glucose 99 74 - 99 mg/dL    Sodium 134 (L) 136 - 145 mmol/L    Potassium 4.3 3.5 - 5.3 mmol/L    Chloride 102 98 - 107 mmol/L    Bicarbonate 25 21 - 32 mmol/L    Anion Gap 11 10 - 20 mmol/L    Urea Nitrogen 11 6 - 23 mg/dL    Creatinine 0.84 0.50 - 1.30 mg/dL    eGFR >90 >60 mL/min/1.73m*2    Calcium 8.9 8.6 - 10.3 mg/dL    Albumin 3.6 3.4 - 5.0 g/dL    Alkaline Phosphatase 54 33 - 120 U/L    Total Protein 5.6 (L) 6.4 - 8.2 g/dL    AST 14 9 - 39 U/L    Bilirubin, Total 0.5 0.0 - 1.2 mg/dL    ALT 21 10 - 52 U/L   Calcium, ionized    Result Value Ref Range    POCT Calcium, Ionized 1.21 1.1 - 1.33 mmol/L   Magnesium   Result Value Ref Range    Magnesium 1.79 1.60 - 2.40 mg/dL     Transthoracic Echo (TTE) Complete    Result Date: 5/8/2024            [facility] [address] [address] [place] [address]    Tel [phone] Fax [phone] TRANSTHORACIC ECHOCARDIOGRAM REPORT  Patient Name:      [facility]   Reading Physician:    [id number] Luis F Bradford MD Study Date:        5/7/2024              Ordering Provider:    [id number] ACE VIDES MRN/PID:           [id number]              Fellow: Accession#:        [id number]          Nurse: Date of Birth/Age: 1975 / 49 years   Sonographer:          Anna Beach RDCS Gender:            M                     Additional Staff: Height:            182.88 cm             Admit Date: Weight:            83.91 kg              Admission Status:     Inpatient -                                                                Routine BSA / BMI:         2.06 m2 / 25.09 kg/m2 Department Location:  [facility] ICU Back                                                                (27-34) Blood Pressure: 152 /80 mmHg Study Type:    TRANSTHORACIC ECHO (TTE) COMPLETE Diagnosis/ICD: Cerebral Infarction, unspecified-I63.9 Indication:    Stroke CPT Codes:     Echo Complete w Full Doppler-02554  Study Detail: The following Echo studies were performed: 2D, M-Mode, Doppler and               color flow. Agitated saline used as a contrast agent for               intraseptal flow evaluation.  PHYSICIAN INTERPRETATION: Left Ventricle: Left ventricular systolic function is normal, with an estimated ejection fraction of 65-70%. There are no regional wall motion abnormalities. The left ventricular cavity size is normal. Spectral  Doppler shows a normal pattern of left ventricular diastolic filling. Left Atrium: The left atrium is normal in size. A bubble study using agitated saline was performed. Bubble study is negative. Right Ventricle: The right ventricle is normal in size. There is normal right ventricular global systolic function. Right Atrium: The right atrium is normal in size. Aortic Valve: The aortic valve is trileaflet. There is no evidence of aortic valve regurgitation. The peak instantaneous gradient of the aortic valve is 3.4 mmHg. The mean gradient of the aortic valve is 1.9 mmHg. Mitral Valve: The mitral valve is normal in structure. There is no evidence of mitral valve regurgitation. Tricuspid Valve: The tricuspid valve is structurally normal. There is trace tricuspid regurgitation. The Doppler estimated RVSP is within normal limits at 10.2 mmHg. Pulmonic Valve: The pulmonic valve is structurally normal. There is trace pulmonic valve regurgitation. Pericardium: There is no pericardial effusion noted. Aorta: The aortic root is normal.  CONCLUSIONS:  1. Left ventricular systolic function is normal with a 65-70% estimated ejection fraction.  2. RVSP within normal limits. QUANTITATIVE DATA SUMMARY: 2D MEASUREMENTS:                          Normal Ranges: LAs:           2.61 cm   (2.7-4.0cm) IVSd:          1.07 cm   (0.6-1.1cm) LVPWd:         1.16 cm   (0.6-1.1cm) LVIDd:         4.70 cm   (3.9-5.9cm) LVIDs:         3.41 cm LV Mass Index: 92.5 g/m2 LV % FS        27.6 % LA VOLUME:                             Normal Ranges: LA Vol A4C:       33.0 ml   (22+/-6mL/m2) LA Vol Index A4C: 16.0ml/m2 LA Vol A4C:       25.7 ml AORTA MEASUREMENTS:                      Normal Ranges: Ao Sinus, d: 2.70 cm (2.1-3.5cm) Ao STJ, d:   2.70 cm (1.7-3.4cm) Asc Ao, d:   2.40 cm (2.1-3.4cm) LV SYSTOLIC FUNCTION BY 2D PLANIMETRY (MOD):                     Normal Ranges: EF-A4C View: 68.7 % (>=55%) EF-A2C View: 69.9 % EF-Biplane:  70.2 % LV DIASTOLIC  FUNCTION:                      Normal Ranges: MV Peak E:  0.56 m/s (0.7-1.2 m/s) MV Peak A:  0.39 m/s (0.42-0.7 m/s) E/A Ratio:  1.44     (1.0-2.2) MV e'       0.12 m/s (>8.0) E/e' Ratio: 4.90     (<8.0) MITRAL VALVE:                      Normal Ranges: MV Vmax:    0.89 m/s (<=1.3m/s) MV peak PG: 3.2 mmHg (<5mmHg) MV mean P.8 mmHg (<48mmHg) MV VTI:     30.75 cm (10-13cm) MV DT:      255 msec (150-240msec) AORTIC VALVE:                                   Normal Ranges: AoV Vmax:                0.92 m/s (<=1.7m/s) AoV Peak PG:             3.4 mmHg (<20mmHg) AoV Mean P.9 mmHg (1.7-11.5mmHg) LVOT Max Gurdeep:            0.74 m/s (<=1.1m/s) AoV VTI:                 21.32 cm (18-25cm) LVOT VTI:                15.31 cm AoV Dimensionless Index: 0.72 TRICUSPID VALVE/RVSP:                             Normal Ranges: Peak TR Velocity: 1.34 m/s RV Syst Pressure: 10.2 mmHg (< 30mmHg) PULMONIC VALVE:                         Normal Ranges: PV Accel Time: 103 msec (>120ms) AORTA: Asc Ao Diam 2.40 cm  [id number] Luis F Bradford MD Electronically signed on 2024 at 7:58:41 AM  ** Final **     CT head wo IV contrast    Result Date: 2024  Interpreted By:  Russ Guerra, STUDY: CT ANGIO HEAD AND NECK W AND WO IV CONTRAST; CT HEAD WO IV CONTRAST; 2024 10:46 pm   INDICATION: Signs/Symptoms:stroke, s/p TNK; right vert artery occlusion; Signs/Symptoms:24 hours post TNK.   COMPARISON: MRI of the brain dated 2024   ACCESSION NUMBER(S): [id number]; [id number]   ORDERING CLINICIAN: ACE VIDES   TECHNIQUE: Axial CT images of the head were obtained without intravenous contrast administration reconstructed in the coronal and sagittal planes.   In addition, thin cut axial CT images were generated over the upper thorax, neck, and head during the arterial passage of a full contrast bolus.  The bolus was generated with a power injector and followed with immediate saline flush. These image data were subtracted and then  used for 3-D reconstructions. Maximum intensity projections and shaded surface displays were generated in multiple planes. In addition images were transferred into a 3-D processing program and additional projections and displays were reviewed by the interpreting physician.   FINDINGS: The CT angiogram through the upper thorax demonstrates no significant stenosis along the origins of the right brachiocephalic artery, left common carotid artery, or left subclavian artery. No significant stenosis noted along the origins of the vertebral arteries.   The CT angiogram of the neck demonstrates no significant stenosis along the common carotid arteries, carotid bifurcations, or cervical segments of the internal carotid arteries. No significant stenosis noted along the cervical segments of the vertebral arteries.   The CT angiogram of the head demonstrates no significant stenosis along the distal internal carotid arteries, distal vertebral arteries, or basilar artery. No large vessel branch cutoffs of the anterior cerebral arteries, middle cerebral arteries, or posterior cerebral arteries are noted. There is enhancement of the posterior inferior cerebellar arteries bilaterally. No intracranial aneurysm is noted.   The CT of the head demonstrate scattered hypodensities along the inferior right cerebellar hemisphere corresponding to areas of abnormal diffusion restriction on the prior MRI dated 05/07/2024 compatible with areas of acute to early subacute infarction within the right posterior inferior cerebellar artery territory. The ventricular system remains nondilated. No hyperdense acute intracranial hemorrhage is noted. There is no midline shift. There is a minimal mucosal thickening noted within the inferior maxillary sinuses and a few scattered ethmoid air cells. The mastoid air cells are clear.   The source CT angiogram images of the neck demonstrate multilevel cervical spondylosis with the most pronounced posterior  disc/osteophyte complexes noted at the C3/4 and C4/5 levels.         The CT of the head demonstrate scattered hypodensities along the inferior right cerebellar hemisphere corresponding to areas of abnormal diffusion restriction on the prior MRI dated 05/07/2024 compatible with areas of acute to early subacute infarction within the right posterior inferior cerebellar artery territory.   The CT angiogram of the neck demonstrates no significant stenosis along the common carotid arteries, carotid bifurcations, or cervical segments of the internal carotid arteries. No significant stenosis noted along the cervical segments of the vertebral arteries.   The CT angiogram of the head demonstrates no significant stenosis along the distal internal carotid arteries, distal vertebral arteries, or basilar artery. No large vessel branch cutoffs of the anterior cerebral arteries, middle cerebral arteries, or posterior cerebral arteries are noted. There is enhancement of the posterior inferior cerebellar arteries bilaterally. No intracranial aneurysm is noted.   The source CT angiogram images of the neck demonstrate multilevel cervical spondylosis with the most pronounced posterior disc/osteophyte complexes noted at the C3/4 and C4/5 levels.     MACRO: None   Signed by: Russ Guerra 5/8/2024 7:10 AM Dictation workstation:   [id number]    CT angio head and neck w and wo IV contrast    Result Date: 5/8/2024  Interpreted By:  Russ Guerra, STUDY: CT ANGIO HEAD AND NECK W AND WO IV CONTRAST; CT HEAD WO IV CONTRAST; 5/7/2024 10:46 pm   INDICATION: Signs/Symptoms:stroke, s/p TNK; right vert artery occlusion; Signs/Symptoms:24 hours post TNK.   COMPARISON: MRI of the brain dated 05/07/2024   ACCESSION NUMBER(S): [id number]; [id number]   ORDERING CLINICIAN: ACE VIDES   TECHNIQUE: Axial CT images of the head were obtained without intravenous contrast administration reconstructed in the coronal and sagittal planes.   In  addition, thin cut axial CT images were generated over the upper thorax, neck, and head during the arterial passage of a full contrast bolus.  The bolus was generated with a power injector and followed with immediate saline flush. These image data were subtracted and then used for 3-D reconstructions. Maximum intensity projections and shaded surface displays were generated in multiple planes. In addition images were transferred into a 3-D processing program and additional projections and displays were reviewed by the interpreting physician.   FINDINGS: The CT angiogram through the upper thorax demonstrates no significant stenosis along the origins of the right brachiocephalic artery, left common carotid artery, or left subclavian artery. No significant stenosis noted along the origins of the vertebral arteries.   The CT angiogram of the neck demonstrates no significant stenosis along the common carotid arteries, carotid bifurcations, or cervical segments of the internal carotid arteries. No significant stenosis noted along the cervical segments of the vertebral arteries.   The CT angiogram of the head demonstrates no significant stenosis along the distal internal carotid arteries, distal vertebral arteries, or basilar artery. No large vessel branch cutoffs of the anterior cerebral arteries, middle cerebral arteries, or posterior cerebral arteries are noted. There is enhancement of the posterior inferior cerebellar arteries bilaterally. No intracranial aneurysm is noted.   The CT of the head demonstrate scattered hypodensities along the inferior right cerebellar hemisphere corresponding to areas of abnormal diffusion restriction on the prior MRI dated 05/07/2024 compatible with areas of acute to early subacute infarction within the right posterior inferior cerebellar artery territory. The ventricular system remains nondilated. No hyperdense acute intracranial hemorrhage is noted. There is no midline shift. There is a  minimal mucosal thickening noted within the inferior maxillary sinuses and a few scattered ethmoid air cells. The mastoid air cells are clear.   The source CT angiogram images of the neck demonstrate multilevel cervical spondylosis with the most pronounced posterior disc/osteophyte complexes noted at the C3/4 and C4/5 levels.         The CT of the head demonstrate scattered hypodensities along the inferior right cerebellar hemisphere corresponding to areas of abnormal diffusion restriction on the prior MRI dated 05/07/2024 compatible with areas of acute to early subacute infarction within the right posterior inferior cerebellar artery territory.   The CT angiogram of the neck demonstrates no significant stenosis along the common carotid arteries, carotid bifurcations, or cervical segments of the internal carotid arteries. No significant stenosis noted along the cervical segments of the vertebral arteries.   The CT angiogram of the head demonstrates no significant stenosis along the distal internal carotid arteries, distal vertebral arteries, or basilar artery. No large vessel branch cutoffs of the anterior cerebral arteries, middle cerebral arteries, or posterior cerebral arteries are noted. There is enhancement of the posterior inferior cerebellar arteries bilaterally. No intracranial aneurysm is noted.   The source CT angiogram images of the neck demonstrate multilevel cervical spondylosis with the most pronounced posterior disc/osteophyte complexes noted at the C3/4 and C4/5 levels.     MACRO: None   Signed by: Russ Guerra 5/8/2024 7:10 AM Dictation workstation:   [id number]    MR brain wo IV contrast    Addendum Date: 5/7/2024    Interpreted By:  Hector Will, ADDENDUM: Hector Will discussed the significance and urgency of this critical finding by telephone with  ACE VIDES on 5/7/2024 at 12:37 pm.  (**-RCF-**) Findings:  See findings.     Signed by: Hector Will 5/7/2024 12:37 PM    -------- ORIGINAL REPORT -------- Dictation workstation:   [id number]    Result Date: 5/7/2024  Interpreted By:  Hector Will, STUDY: MR BRAIN WO IV CONTRAST;  5/7/2024 12:24 pm   INDICATION: Signs/Symptoms:stroke.   COMPARISON: None.   ACCESSION NUMBER(S): [id number]   ORDERING CLINICIAN: ACE VIDES   TECHNIQUE: The brain was studied in the sagittal, axial and coronal planes utilizing FLAIR, T1 and T2 weighted images.   FINDINGS: There is a normal-size ventricular system.  There is no evidence of intracranial mass or extra-axial collection.  The skull base, paranasal sinuses and orbital structures are unremarkable. Diffusion weighted images and associated ADC maps of the brain demonstrate restriction in the right cerebellar hemisphere consistent with acute infarction in the inferior hemisphere within the distribution of right PICA. No abnormal diffusion restriction within the medulla.. Gradient echo T2 weighted images fail to demonstrate hemosiderin deposition or other evidence of hemorrhage.       * Acute infarction in the inferior portion of the right cerebellar hemisphere without associated hemorrhage or mass effect. The brainstem is spared *No other evidence of intracranial mass, extra-axial collection or hemorrhage.   MACRO: none   Signed by: Hector Will 5/7/2024 12:30 PM Dictation workstation:   [id number]    ECG 12 lead    Result Date: 5/7/2024  Normal sinus rhythm Normal ECG No previous ECGs available    CT head wo IV contrast    Result Date: 5/6/2024  Interpreted By:  Nazario Mtz, STUDY: CT HEAD WO IV CONTRAST;  5/6/2024 11:19 pm   INDICATION: Signs/Symptoms:Headache post tnk.   COMPARISON: Same-day CT   ACCESSION NUMBER(S): [id number]   ORDERING CLINICIAN: JO-ANN MCKINLEY   TECHNIQUE: Axial noncontrast CT images of the head. Sagittal and coronal reformats were provided.   FINDINGS: BRAIN: No acute intracranial hemorrhage. No mass effect or midline shift. Gray-white matter  interfaces are preserved.   VENTRICLES and EXTRA-AXIAL SPACES: Normal.   EXTRACRANIAL SOFT TISSUES:  Within normal limits.   PARANASAL SINUSES/MASTOIDS: The visualized paranasal sinuses and mastoid air cells are aerated.   BONES AND ORBITS: No displaced skull fracture. Orbits are within normal limits.   OTHER FINDINGS: None.       1. No acute intracranial hemorrhage or mass effect.   Signed by: Nazario Mtz 5/6/2024 11:46 PM Dictation workstation:   [id number]    CT brain attack angio head and neck W and WO IV contrast    Result Date: 5/6/2024  Interpreted By:  Wilder Marion, STUDY: CT BRAIN ATTACK ANGIO HEAD AND NECK W AND WO IV CONTRAST;  5/6/2024 10:55 pm   INDICATION: Signs/Symptoms:NIHSS 6.   COMPARISON: None   ACCESSION NUMBER(S): [id number]   ORDERING CLINICIAN: JO-ANN MCKINLEY   TECHNIQUE: Contiguous axial images of the head and neck were obtained after the intravenous administration of contrast. Coronal and sagittal reformatted images were obtained of the axial images. MIPS and 3D reformatted images were also performed and reviewed.   FINDINGS: The bilateral anterior cerebral arteries, middle cerebral arteries, and posterior cerebral arteries are grossly patent.   The bilateral common carotid internal carotid artery is grossly patent.   There is segment of thrombus with marked narrowing of the V4 segment of the right vertebral artery. The left vertebral artery is grossly patent.       Thrombus with marked narrowing of V4 segment of right vertebral artery.   MACRO: Wilder Marion discussed the significance and urgency of this critical finding by telephone with the emergency room physician at [facility] on 5/6/2024 at 11:23 pm.  (**-RCF-**) Findings:  See findings.   Signed by: Wilder Marion 5/6/2024 11:26 PM Dictation workstation:   [id number]    CT brain attack head wo IV contrast    Result Date: 5/6/2024  Interpreted By:  Nazario Mtz, STUDY: CT BRAIN ATTACK HEAD WO IV CONTRAST;   5/6/2024 10:16 pm   INDICATION: Signs/Symptoms:RUE + RLE weakness, R sided headache.   COMPARISON: None.   ACCESSION NUMBER(S): [id number]   ORDERING CLINICIAN: JO-ANN MCKINLEY   TECHNIQUE: Axial noncontrast CT images of the head. Sagittal and coronal reformats were provided.   FINDINGS: BRAIN: No acute intracranial hemorrhage. No mass effect or midline shift. Gray-white matter interfaces are preserved.   VENTRICLES and EXTRA-AXIAL SPACES: Normal.   EXTRACRANIAL SOFT TISSUES:  Within normal limits.   PARANASAL SINUSES/MASTOIDS: The visualized paranasal sinuses and mastoid air cells are aerated.   BONES AND ORBITS: No displaced skull fracture. Orbits are within normal limits.   OTHER FINDINGS: None.       1. No acute intracranial hemorrhage or mass effect.   MACRO: Nazario Mtz discussed the significance and urgency of this critical finding by telephone with  Dr. Justin Carrizales on 5/6/2024 at 10:21 pm.  (**-RCF-**) Findings:  See findings.   Signed by: Nazario Mtz 5/6/2024 10:24 PM Dictation workstation:   [id number]       NIH Stroke Scale  1A. Level of Consciousness: Alert, Keenly Responsive  1B. Ask Month and Age: Both Questions Right  1C. Blink Eyes & Squeeze Hands: Performs Both Tasks  2. Best Gaze: Normal  3. Visual: No Visual Loss  4. Facial Palsy: Normal Symmetrical Movements  5A. Motor - Left Arm: No Drift  5B. Motor - Right Arm: No Drift  6A. Motor - Left Leg: No Drift  6B. Motor - Right Leg: No Drift  7. Limb Ataxia: Absent  8. Sensory Loss: Normal  9. Best Language: No Aphasia  10. Dysarthria: Normal  11. Extinction and Inattention: No Abnormality  NIH Stroke Scale: 0                Assessment/Plan      Principal Problem:    Stroke aborted by administration of thrombolytic agent (Multi)  Active Problems:    Bradycardia  Acute ischemic stroke right PICA; thrombus right vertebral artery  No current risk factors for stroke: cryptogenic at this time.     Recommend:     Start aspirin 81mg  daily  Continue statin  Hypercoag work up pending    Telemetry  Neuro checks/vital signs every 4 hours  PT/OT/ST  Discharge with holter monitor for 2 weeks- results to Dr. King Redding please  Follow up with Dr. Redding after discharge in 2 months.     Case/plan discussed and pt seen with Dr. Redding   No further needs from neurology; okay for transfer or discharge as per primary team. Please contact if condition changes for re-eval.           I spent 40 minutes in the professional and overall care of this patient.      Billie Doe, APRN-CNP

## 2024-05-08 NOTE — PROGRESS NOTES
05/08/24 1118   Discharge Planning   Home or Post Acute Services None   Patient expects to be discharged to: home   Does the patient need discharge transport arranged? No     Spoke to patient at bedside to discuss therapy recommendations that patient can return home. Patient agrees and plans to return home when medically ready d/c.

## 2024-05-08 NOTE — PROGRESS NOTES
Spiritual Care Visit    Clinical Encounter Type  Visited With: Patient  Routine Visit: Introduction  Continue Visiting: No                                            Taxonomy  Intended Effects: Preserve dignity and respect, Helping someone feel comforted, Promote sense of peace  Methods: Offer support  Interventions: Share words of hope and inspiration    Patient shared he has kids that are teens.  Patient's family was surrounding him.   listened to his story and spoke encouraging words.

## 2024-05-09 ENCOUNTER — APPOINTMENT (OUTPATIENT)
Dept: CARDIOLOGY | Facility: HOSPITAL | Age: 49
DRG: 062 | End: 2024-05-09
Payer: COMMERCIAL

## 2024-05-09 VITALS
SYSTOLIC BLOOD PRESSURE: 116 MMHG | WEIGHT: 187.2 LBS | RESPIRATION RATE: 18 BRPM | HEART RATE: 63 BPM | DIASTOLIC BLOOD PRESSURE: 61 MMHG | BODY MASS INDEX: 25.35 KG/M2 | HEIGHT: 72 IN | TEMPERATURE: 97.3 F | OXYGEN SATURATION: 100 %

## 2024-05-09 LAB
ALBUMIN: 4.1 G/DL (ref 3.4–5)
ALPHA 1 GLOBULIN: 0.2 G/DL (ref 0.2–0.6)
ALPHA 2 GLOBULIN: 0.6 G/DL (ref 0.4–1.1)
BETA GLOBULIN: 0.7 G/DL (ref 0.5–1.2)
BODY SURFACE AREA: 2.08 M2
DRVVT SCREEN TO CONFIRM RATIO: 0.8 RATIO
DRVVT/DRVVT CFM NRMLZD PPP-RTO: 0.98 RATIO
DRVVT/DRVVT CFM P DOAC NEUT NORM PPP-RTO: 0.82 RATIO
GAMMA GLOBULIN: 0.6 G/DL (ref 0.5–1.4)
IMMUNOFIXATION COMMENT: NORMAL
LA 2 SCREEN W REFLEX-IMP: NORMAL
NORMALIZED SCT PPP-RTO: 0.76 RATIO
PATH REVIEW - SERUM IMMUNOFIXATION: NORMAL
PATH REVIEW-SERUM PROTEIN ELECTROPHORESIS: NORMAL
PROT C AG ACT/NOR PPP IA: 81 % (ref 63–153)
PROT S AG ACT/NOR PPP IA: 93 % (ref 84–134)
PROTEIN ELECTROPHORESIS COMMENT: NORMAL
SILICA CLOTTING TIME CONFIRMATION: 1.12 RATIO
SILICA CLOTTING TIME SCREEN: 0.85 RATIO

## 2024-05-09 PROCEDURE — 93248 EXT ECG>7D<15D REV&INTERPJ: CPT | Performed by: PHYSICIAN ASSISTANT

## 2024-05-09 PROCEDURE — 2500000001 HC RX 250 WO HCPCS SELF ADMINISTERED DRUGS (ALT 637 FOR MEDICARE OP): Performed by: PHYSICIAN ASSISTANT

## 2024-05-09 PROCEDURE — 93246 EXT ECG>7D<15D RECORDING: CPT

## 2024-05-09 RX ORDER — NAPROXEN SODIUM 220 MG/1
81 TABLET, FILM COATED ORAL DAILY
Qty: 30 TABLET | Refills: 1 | Status: SHIPPED | OUTPATIENT
Start: 2024-05-10 | End: 2024-07-09

## 2024-05-09 RX ORDER — ATORVASTATIN CALCIUM 80 MG/1
80 TABLET, FILM COATED ORAL NIGHTLY
Qty: 30 TABLET | Refills: 1 | Status: SHIPPED | OUTPATIENT
Start: 2024-05-09 | End: 2024-07-08

## 2024-05-09 RX ADMIN — ASPIRIN 81 MG 81 MG: 81 TABLET ORAL at 08:47

## 2024-05-09 RX ADMIN — DOCUSATE SODIUM 100 MG: 100 CAPSULE, LIQUID FILLED ORAL at 08:47

## 2024-05-09 ASSESSMENT — COGNITIVE AND FUNCTIONAL STATUS - GENERAL
DAILY ACTIVITIY SCORE: 24
MOBILITY SCORE: 24
DAILY ACTIVITIY SCORE: 24
MOBILITY SCORE: 24

## 2024-05-09 ASSESSMENT — PAIN SCALES - GENERAL
PAINLEVEL_OUTOF10: 0 - NO PAIN
PAINLEVEL_OUTOF10: 0 - NO PAIN

## 2024-05-09 ASSESSMENT — PAIN SCALES - WONG BAKER: WONGBAKER_NUMERICALRESPONSE: NO HURT

## 2024-05-09 NOTE — CARE PLAN
The patient's goals for the shift include      The clinical goals for the shift include patient will remian neurologically stable throughout the shift    Problem: Pain  Goal: My pain/discomfort is manageable  Outcome: Progressing     Problem: Safety  Goal: Patient will be injury free during hospitalization  Outcome: Progressing  Goal: I will remain free of falls  Outcome: Progressing     Problem: Daily Care  Goal: Daily care needs are met  Outcome: Progressing     Problem: Psychosocial Needs  Goal: Demonstrates ability to cope with hospitalization/illness  Outcome: Progressing  Goal: Collaborate with me, my family, and caregiver to identify my specific goals  Outcome: Progressing     Problem: Discharge Barriers  Goal: My discharge needs are met  Outcome: Progressing

## 2024-05-09 NOTE — NURSING NOTE
1130- assumed care of patient  1218-pt denies pain, denies numbness or tingling, neuro checks WDL, will continue to monitor  1330-safety check  1455-RN reviewed discharge instructions with patient at this time, understands. IV's out, pt ambulates steady and walks out with family

## 2024-05-13 LAB
ELECTRONICALLY SIGNED BY: NORMAL
ELECTRONICALLY SIGNED BY: NORMAL
FACTOR II-PROTHROMBIN INTERPRETATION: NORMAL
FACTOR II-PROTHROMBIN RESULT: NORMAL
FACTOR V LEIDEN INTERPRETATION: NORMAL
FACTOR V LEIDEN RESULT: NORMAL

## 2024-05-14 NOTE — DISCHARGE SUMMARY
HOSPITAL COURSE AND DETAILS INCLUDING HPI/PHYSICAL EXAM /AND ADMISSION INFO  Jerardo Olmedo is a 49 y.o. male with no reported past medical history who presented to the emergency department as a brain attack with concern for ischemic stroke with last known well 2115.  Patient states symptoms started with sudden onset right-sided weakness, inability to walk or stand without falling over, pins and needle sensation in his right arm.  Patient states he developed a dull frontal headache after symptoms started.  Denies history of migraines, head trauma, personal or family history of berry aneurysms or connective tissue disorders.  Patient's initial NIH in the emergency department with a 7 given partial gaze palsy, minor flattening of the nasolabial fold, drift in the right right arm and leg, decreased sensation in the right extremity and limb ataxia in the right arm and leg. initial head CT showed no evidence of infarct or hemorrhage so patient was given TNK.  While in the ED patient started become bradycardic in the 40s and repeat head CT scan was ordered due to concern for hemorrhage status post TNK which showed no evidence of acute hemorrhage.  CT angio showed thrombus with marked narrowing of V4 segment of the right vertebral artery.  Patient is not a candidate for mechanical thrombectomy per ED staff.  Patient was given Reglan and Benadryl in the ED which patient states has improved his nausea and headache and reports only a mild throbbing headache at this time.  States all of his other symptoms including dizziness, extremity weakness, pins and needle sensation in his right arm have resolved.  Patient admitted to the ICU for close observation including every hour neurochecks status post TNK administration.     Vitals and nursing note reviewed.   Constitutional:       General: He is not in acute distress.     Appearance: Normal appearance. He is not ill-appearing, toxic-appearing or diaphoretic.   HENT:       Head: Normocephalic and atraumatic.      Right Ear: External ear normal.      Left Ear: External ear normal.      Nose: Nose normal.   Eyes:      Extraocular Movements: Extraocular movements intact.      Pupils: Pupils are equal, round, and reactive to light.   Neck:      Vascular: No carotid bruit.   Cardiovascular:      Rate and Rhythm: Normal rate and regular rhythm.      Pulses: Normal pulses.      Heart sounds: Normal heart sounds.   Pulmonary:      Effort: Pulmonary effort is normal.      Breath sounds: Normal breath sounds.   Abdominal:      General: Abdomen is flat.      Palpations: Abdomen is soft.   Musculoskeletal:         General: Normal range of motion.      Cervical back: Normal range of motion and neck supple. No rigidity or tenderness.   Lymphadenopathy:      Cervical: No cervical adenopathy.   Skin:     General: Skin is warm and dry.      Capillary Refill: Capillary refill takes less than 2 seconds.   Neurological:      Mental Status: He is alert.      Comments: Patient has leftward beating nystagmus but otherwise cranial nerves II through XII intact with an NIH of 0.  Ambulation to check for gait ataxia deferred given TNK administration.   Psychiatric:         Mood and Affect: Mood normal.         Behavior: Behavior normal.       Patient was admitted to the hospital.  For his abnormal mentation.  He did have TNK administration was admitted to the ICU I did see him for 1 day patient was then discharged from the ICU after adjustment of his medications and clearance by neurology he was discharged home            DISCHARGE DIAGNOSIS          Problem List Items Addressed This Visit             ICD-10-CM    * (Principal) Stroke aborted by administration of thrombolytic agent (Multi) - Primary I63.9    Relevant Medications    aspirin 81 mg chewable tablet    atorvastatin (Lipitor) 80 mg tablet    Other Relevant Orders    Critical Care (Completed)    Transthoracic Echo (TTE) Complete (Completed)    Holter  Or Event Cardiac Monitor     Other Visit Diagnoses         Codes    Acute nonintractable headache, unspecified headache type     R51.9                                      Last Recorded Vitals:  Vitals:    05/09/24 0400 05/09/24 0500 05/09/24 0800 05/09/24 1200   BP: 114/67  125/69 116/61   BP Location: Left arm      Patient Position: Lying      Pulse: 59  61 63   Resp: 16  18 18   Temp: 36.1 °C (97 °F)  37.1 °C (98.8 °F) 36.3 °C (97.3 °F)   TempSrc: Temporal      SpO2: 96%  100% 100%   Weight:  84.9 kg (187 lb 3.2 oz)     Height:                 DISCHARGE MEDICATIONS       Your medication list        START taking these medications        Instructions Last Dose Given Next Dose Due   aspirin 81 mg chewable tablet  Start taking on: May 10, 2024      Chew 1 tablet (81 mg) once daily.       atorvastatin 80 mg tablet  Commonly known as: Lipitor      Take 1 tablet (80 mg) by mouth once daily at bedtime.              CONTINUE taking these medications        Instructions Last Dose Given Next Dose Due   multivitamin with minerals tablet                     Where to Get Your Medications        These medications were sent to [facility]/pharmacy #[id number] - [address] AT [facility]  [id number] [place], [place] [address]      Phone: [phone]   aspirin 81 mg chewable tablet  atorvastatin 80 mg tablet           OUTPATIENT FOLLOW-UP    Future Appointments   Date Time Provider Department Center   7/10/2024 10:30 AM CHAD Pete [id number] [place]       Patient has been given the appropriate discharge instructions discharge medications and follow-up with appropriate physicians.  For all of the pertinent data  including labs- consults-imaging please see the chart.

## 2024-05-15 NOTE — DOCUMENTATION CLARIFICATION NOTE
"    PATIENT:               SUMMER NO  ACCT #:                  [id number]  MRN:                       [id number]  :                       1975  ADMIT DATE:       2024 10:11 PM  DISCH DATE:        2024 2:55 PM  RESPONDING PROVIDER #:        [id number]          PROVIDER RESPONSE TEXT:    right sided hemiparesis    CDI QUERY TEXT:    Clarification    Instruction:    Based on your assessment of the patient and the clinical information, please provide the requested documentation by clicking on the appropriate radio button and enter any additional information if prompted.    Question: Please further clarify after study the diagnosis of right sided weakness as:    When answering this query, please exercise your independent professional judgment. The fact that a question is being asked, does not imply that any particular answer is desired or expected.    The patient's clinical indicators include:  Clinical Information: 50 y/o M admitted with CVA with thrombus    Clinical Indicators:    D note  Dr. Carrizales: \"presents the ED brought as a stroke alert.  He reportedly bent over, and when he stood up, had significant dizziness along with right-sided weakness and some headache.  CT head no acute intracranial hemorrhage noted, NIH remains elevated.  At this time, the patient is TNK candidate.  CT angiogram: right-sided vertebral artery occlusion of the V4 branch.  NIH 7.  drift in the right right arm and leg, decreased sensation in the right extremity and limb ataxia in the right arm and leg. \"    H/P  Dr. Carreno:  \" Patient states symptoms started with sudden onset right-sided weakness, inability to walk or stand without falling over, pins and needle sensation in his right arm.  Stroke aborted by administration of thrombolytic agent\"    MD [facility]  Dr. Garnett:  \"CVA w/ thrombus in V4 segment of RVA and is s/p TNK administration. Patient without any apparent residual neuro deficits at this time.\"    neuro " "consult 5/7 Andrew: \"Pt says he bent down to  the TV remote and when he stood back up, his right side was weak and numb and he was \"staggering all over the place.\" He was unable to stand or walk.   Ischemic stroke improved with TNK.  NIH Stroke Scale: 0 \"    D/C summary 5/8 Dr. Uribe:  \"Patient's initial NIH in the emergency department with a 7 given partial gaze palsy, minor flattening of the nasolabial fold, drift in the right right arm and leg, decreased sensation in the right extremity and limb ataxia in the right arm and leg. CT angio showed thrombus with marked narrowing of V4 segment of the right vertebral artery.  States all of his other symptoms including dizziness, extremity weakness, pins and needle sensation in his right arm have resolved\"    Treatment: neurology consult, TNK administration in ED, NIH score, CT head, CT angio head and neck, MRI brain, Q1hr neuro checks, PT/OT    Risk Factors: CVA w/thrombus s/p TNK, right sided weakness  Options provided:  -- right sided hemiparesis  -- Other - I will add my own diagnosis  -- Refer to Clinical Documentation Reviewer    Query created by: Iwona Herrera on 5/14/2024 1:55 PM      Electronically signed by:  ADAN URIBE MD 5/15/2024 8:37 AM          "

## 2024-05-22 ENCOUNTER — HOSPITAL ENCOUNTER (EMERGENCY)
Facility: HOSPITAL | Age: 49
Discharge: HOME | End: 2024-05-22
Attending: STUDENT IN AN ORGANIZED HEALTH CARE EDUCATION/TRAINING PROGRAM
Payer: COMMERCIAL

## 2024-05-22 ENCOUNTER — APPOINTMENT (OUTPATIENT)
Dept: CARDIOLOGY | Facility: HOSPITAL | Age: 49
End: 2024-05-22
Payer: COMMERCIAL

## 2024-05-22 VITALS
HEART RATE: 62 BPM | OXYGEN SATURATION: 99 % | SYSTOLIC BLOOD PRESSURE: 138 MMHG | BODY MASS INDEX: 22.38 KG/M2 | WEIGHT: 180 LBS | HEIGHT: 75 IN | RESPIRATION RATE: 16 BRPM | DIASTOLIC BLOOD PRESSURE: 91 MMHG | TEMPERATURE: 97.3 F

## 2024-05-22 DIAGNOSIS — L03.90 CELLULITIS, UNSPECIFIED CELLULITIS SITE: Primary | ICD-10-CM

## 2024-05-22 DIAGNOSIS — R60.0 LOCALIZED EDEMA: ICD-10-CM

## 2024-05-22 LAB
ALBUMIN SERPL BCP-MCNC: 4.5 G/DL (ref 3.4–5)
ALP SERPL-CCNC: 61 U/L (ref 33–120)
ALT SERPL W P-5'-P-CCNC: 41 U/L (ref 10–52)
ANION GAP SERPL CALC-SCNC: 12 MMOL/L (ref 10–20)
AST SERPL W P-5'-P-CCNC: 33 U/L (ref 9–39)
BILIRUB SERPL-MCNC: 0.6 MG/DL (ref 0–1.2)
BUN SERPL-MCNC: 20 MG/DL (ref 6–23)
CALCIUM SERPL-MCNC: 9.2 MG/DL (ref 8.6–10.3)
CHLORIDE SERPL-SCNC: 107 MMOL/L (ref 98–107)
CK SERPL-CCNC: 352 U/L (ref 0–325)
CO2 SERPL-SCNC: 25 MMOL/L (ref 21–32)
CREAT SERPL-MCNC: 0.8 MG/DL (ref 0.5–1.3)
EGFRCR SERPLBLD CKD-EPI 2021: >90 ML/MIN/1.73M*2
ERYTHROCYTE [DISTWIDTH] IN BLOOD BY AUTOMATED COUNT: 13.1 % (ref 11.5–14.5)
GLUCOSE SERPL-MCNC: 96 MG/DL (ref 74–99)
HCT VFR BLD AUTO: 41.1 % (ref 41–52)
HGB BLD-MCNC: 13.5 G/DL (ref 13.5–17.5)
HOLD SPECIMEN: NORMAL
INR PPP: 1.1 (ref 0.9–1.1)
MCH RBC QN AUTO: 28.1 PG (ref 26–34)
MCHC RBC AUTO-ENTMCNC: 32.8 G/DL (ref 32–36)
MCV RBC AUTO: 86 FL (ref 80–100)
NRBC BLD-RTO: 0 /100 WBCS (ref 0–0)
PLATELET # BLD AUTO: 194 X10*3/UL (ref 150–450)
POTASSIUM SERPL-SCNC: 3.7 MMOL/L (ref 3.5–5.3)
PROT SERPL-MCNC: 6.7 G/DL (ref 6.4–8.2)
PROTHROMBIN TIME: 12.4 SECONDS (ref 9.8–12.8)
RBC # BLD AUTO: 4.8 X10*6/UL (ref 4.5–5.9)
SODIUM SERPL-SCNC: 140 MMOL/L (ref 136–145)
WBC # BLD AUTO: 6.3 X10*3/UL (ref 4.4–11.3)

## 2024-05-22 PROCEDURE — 82550 ASSAY OF CK (CPK): CPT

## 2024-05-22 PROCEDURE — 2500000001 HC RX 250 WO HCPCS SELF ADMINISTERED DRUGS (ALT 637 FOR MEDICARE OP): Performed by: STUDENT IN AN ORGANIZED HEALTH CARE EDUCATION/TRAINING PROGRAM

## 2024-05-22 PROCEDURE — 99285 EMERGENCY DEPT VISIT HI MDM: CPT | Mod: 25 | Performed by: STUDENT IN AN ORGANIZED HEALTH CARE EDUCATION/TRAINING PROGRAM

## 2024-05-22 PROCEDURE — 36415 COLL VENOUS BLD VENIPUNCTURE: CPT

## 2024-05-22 PROCEDURE — 80053 COMPREHEN METABOLIC PANEL: CPT

## 2024-05-22 PROCEDURE — 93970 EXTREMITY STUDY: CPT

## 2024-05-22 PROCEDURE — 93970 EXTREMITY STUDY: CPT | Performed by: INTERNAL MEDICINE

## 2024-05-22 PROCEDURE — 85027 COMPLETE CBC AUTOMATED: CPT

## 2024-05-22 PROCEDURE — 99284 EMERGENCY DEPT VISIT MOD MDM: CPT | Performed by: STUDENT IN AN ORGANIZED HEALTH CARE EDUCATION/TRAINING PROGRAM

## 2024-05-22 PROCEDURE — 85610 PROTHROMBIN TIME: CPT | Performed by: STUDENT IN AN ORGANIZED HEALTH CARE EDUCATION/TRAINING PROGRAM

## 2024-05-22 RX ORDER — CEPHALEXIN 500 MG/1
500 CAPSULE ORAL ONCE
Status: COMPLETED | OUTPATIENT
Start: 2024-05-22 | End: 2024-05-22

## 2024-05-22 RX ORDER — CEPHALEXIN 500 MG/1
500 CAPSULE ORAL 4 TIMES DAILY
Qty: 40 CAPSULE | Refills: 0 | Status: SHIPPED | OUTPATIENT
Start: 2024-05-22 | End: 2024-06-01

## 2024-05-22 RX ADMIN — CEPHALEXIN 500 MG: 500 CAPSULE ORAL at 20:44

## 2024-05-22 ASSESSMENT — COLUMBIA-SUICIDE SEVERITY RATING SCALE - C-SSRS
2. HAVE YOU ACTUALLY HAD ANY THOUGHTS OF KILLING YOURSELF?: NO
6. HAVE YOU EVER DONE ANYTHING, STARTED TO DO ANYTHING, OR PREPARED TO DO ANYTHING TO END YOUR LIFE?: NO
1. IN THE PAST MONTH, HAVE YOU WISHED YOU WERE DEAD OR WISHED YOU COULD GO TO SLEEP AND NOT WAKE UP?: NO

## 2024-05-22 ASSESSMENT — PAIN - FUNCTIONAL ASSESSMENT
PAIN_FUNCTIONAL_ASSESSMENT: 0-10
PAIN_FUNCTIONAL_ASSESSMENT: 0-10

## 2024-05-22 ASSESSMENT — PAIN DESCRIPTION - ORIENTATION: ORIENTATION: LEFT;RIGHT

## 2024-05-22 ASSESSMENT — LIFESTYLE VARIABLES
EVER FELT BAD OR GUILTY ABOUT YOUR DRINKING: NO
HAVE YOU EVER FELT YOU SHOULD CUT DOWN ON YOUR DRINKING: NO
TOTAL SCORE: 0
HAVE PEOPLE ANNOYED YOU BY CRITICIZING YOUR DRINKING: NO
EVER HAD A DRINK FIRST THING IN THE MORNING TO STEADY YOUR NERVES TO GET RID OF A HANGOVER: NO

## 2024-05-22 ASSESSMENT — PAIN SCALES - GENERAL
PAINLEVEL_OUTOF10: 0 - NO PAIN
PAINLEVEL_OUTOF10: 1
PAINLEVEL_OUTOF10: 2
PAINLEVEL_OUTOF10: 1

## 2024-05-22 ASSESSMENT — PAIN DESCRIPTION - LOCATION: LOCATION: LEG

## 2024-05-22 ASSESSMENT — PAIN DESCRIPTION - PAIN TYPE: TYPE: ACUTE PAIN

## 2024-05-22 NOTE — ED PROVIDER NOTES
"HPI   Chief Complaint   Patient presents with    Leg Pain     Pt states for about 5-6 days his legs have been feeling \"sore, almost like after a workout\" States this morning he found bumps on his calves. No numbness or tingling present. No decrease in sensation, denies changes in vision, chest pain, or SOB. States soreness starts below the knee.     Hives       HPI  49-year-old male with past medical history of stroke s/p TNK on 5/6/2024, he was discharged on aspirin and Lipitor on 5/9/2024.  Patient stated that since discharge he has been complaining of muscle soreness, mainly both calves, with no other associated symptoms. Last night he developed bumps on lower extremities, which are red and painful, no associated leg swelling.    No chest pain, no shortness of breath, no weakness, no numbness, no insect bite, no trauma                    [place] Coma Scale Score: 15                     Patient History   No past medical history on file.  No past surgical history on file.  No family history on file.  Social History     Tobacco Use    Smoking status: Not on file    Smokeless tobacco: Not on file   Substance Use Topics    Alcohol use: Not on file    Drug use: Not on file       Physical Exam   ED Triage Vitals [05/22/24 1716]   Temperature Heart Rate Respirations BP   36.3 °C (97.3 °F) 85 18 136/64      Pulse Ox Temp src Heart Rate Source Patient Position   100 % -- Monitor Sitting      BP Location FiO2 (%)     Right arm --       Physical Exam  General: Awake, alert/oriented x4, well developed, no acute distress  Head: Atraumatic/Normocephalic  Eyes: Normal external exam, EOMI, PERRLA  ENT: Oropharynx normal. Uvula midline, no tonsillar edema, moist mucous membranes  Cardiovascular: RRR, S1/S2, no murmurs, rubs, or gallops, radial pulses +2, no edema of extremities  Pulmonary: CTAB, no respiratory distress. No wheezes, rales, or ronchi  Abdomen: +BS, soft, non-tender, nondistended, no guarding or rebound, no masses " noted  MSK: No joint swelling, normal movements of all extremities. Range of motion- normal.  Extremities: FROM, no edema, wounds, or contusions  Skin- rounded erythematous, tender raised lesion on the posterior lower aspect of the knee joint, no skin wounds, no fluctuation, no drainage  Neuro: Alert/oriented x4, no focal motor or sensory deficits  Psychiatric: Judgment intact. Appropriate mood and behavior   ED Course & MDM   Diagnoses as of 05/22/24 3439   Cellulitis, unspecified cellulitis site       Medical Decision Making  Patient presented to ED with muscle pain that started 2 weeks ago, lower extremity skin lesions that started last night of note patient has recently been started on Lipitor after he was admitted with a stroke.  In the ED blood pressure is 136/64, pulse is 85, is afebrile, saturation 100% on room air labs were done and showed white BC of 6.3, hemoglobin of 15.5, sodium 140, potassium 3.7 normal liver function test, normal kidney function test, CK of 352.  Bilateral duplex ultrasound was done and is negative for DVT.  Patient was suspected to have panniculitis/cellulitis.  He will be discharged on Keflex.  Assessment and plan discussed with attending physician    Procedure  Procedures     Nisha Rivera MD  Resident  05/22/24 4900

## 2024-06-30 NOTE — PROGRESS NOTES
Subjective   Jerardo Olmedo is a 49 y.o.   male. [facility] 5-6-24, Dr. Vahid Wise  [facility]  PMH of  Is here being seen s/p hospitalization for stroke-like symptoms of acute dizziness, right-sided weakness and mild headache. NIHSS 7, CTA showed thrombus with marked narrowing of V4 segment of right vertebral artery, and he was administered TNK and sent to ICU for neuro monitoring. MRI confirmed and acute infarct in right cerebellar region. TTE normal. Hypercoagulable work up normal. HgbA1c 5.6. He was discharged on aspirin, high intensity statin, and holter monitor. He presented to the ED again on 5/22/24 for BLE soreness and rash x 5-6 days. He was diagnosed with panniculitis/cellulitis, discharged on Keflex.   Today, he continues with dizziness when getting up and down, denies falls at home. Continues with aspirin and statin-no bleeding issues or side effects. He has finished PT/OT. He is eating and drinking ok, down 15#, exercising daily. He has seen his PCP since discharge.   I have reviewed the medications, labs, imaging results, and the chart. Review of systems are negative unless otherwise specified in HPI.     Objective   Neurological Exam  Mental Status  Awake, alert and oriented to person, place and time. Recent and remote memory are intact. Speech is normal. Language is fluent with no aphasia. Attention and concentration are normal.    Cranial Nerves  CN II: Right visual acuity: Counts fingers. Left visual acuity: Counts fingers. Right normal visual field. Left normal visual field.  CN III, IV, VI: Extraocular movements intact bilaterally. No nystagmus.   Right pupil: 3 mm. Round. Reactive to light. Reactive to accommodation.   Left pupil: 3 mm. Round. Reactive to light. Reactive to accommodation.  CN V:  Right: Facial sensation is normal.  Left: Facial sensation is normal on the left.  CN VII:  Right: There is no facial weakness.  Left: There is no facial weakness.  CN VIII:  Right: Hearing is normal.  Left:  Hearing is normal.  CN IX, X:  Right: Palate is normal.  Left: Palate is normal.  CN XI:  Right: Trapezius strength is normal.  Left: Trapezius strength is normal.  CN XII: Tongue midline without atrophy or fasciculations.    Motor  Normal muscle bulk throughout. Normal muscle tone. Strength is 5/5 throughout all four extremities.    Sensory  Light touch is normal in upper and lower extremities.     Reflexes  Deep tendon reflexes are 2+ and symmetric in all four extremities.    Coordination  Right: Finger-to-nose normal.Left: Finger-to-nose normal.    Gait  Casual gait is normal including stance, stride, and arm swing.    Physical Exam  HENT:      Right Ear: Hearing normal.      Left Ear: Hearing normal.   Eyes:      Extraocular Movements: EOM normal. No nystagmus.   Neurological:      Motor: Motor strength is normal.     Deep Tendon Reflexes: Reflexes are normal and symmetric.   Psychiatric:         Speech: Speech normal.           Assessment/Plan   To follow up with Dr. Redding in 3 months. Continue with current medication regimen, referral given for sleep medicine. Re: snoring.   Discussed bleeding precautions with patient while on anti platelet and/or anticoagulation therapy. Discussed stroke prevention such as: HgbA1c <7, tight blood pressure control <130/<80, LDL <70 with statin therapy (if indicated), CPAP/BiPAP compliance (if indicated) and lifestyle modification (Mediterranean diet, daily exercise, nicotine cessation & limiting alcohol use).   Discussed s/sx of stroke such as: face drooping, arm/leg weakness, speech difficulty; sudden numbness of face/extremity, sudden confusion, sudden trouble seeing, sudden trouble walking, sudden severe headache, dizziness, loss of balance or coordination and to call 911 immediately.?

## 2024-07-10 ENCOUNTER — APPOINTMENT (OUTPATIENT)
Dept: NEUROLOGY | Facility: CLINIC | Age: 49
End: 2024-07-10
Payer: COMMERCIAL

## 2024-07-10 ENCOUNTER — TELEPHONE (OUTPATIENT)
Dept: NEUROLOGY | Facility: CLINIC | Age: 49
End: 2024-07-10

## 2024-07-10 VITALS
BODY MASS INDEX: 22.85 KG/M2 | DIASTOLIC BLOOD PRESSURE: 58 MMHG | HEIGHT: 75 IN | WEIGHT: 183.8 LBS | SYSTOLIC BLOOD PRESSURE: 106 MMHG | HEART RATE: 64 BPM

## 2024-07-10 DIAGNOSIS — I63.9 STROKE ABORTED BY ADMINISTRATION OF THROMBOLYTIC AGENT (MULTI): ICD-10-CM

## 2024-07-10 DIAGNOSIS — I63.9 STROKE ABORTED BY ADMINISTRATION OF THROMBOLYTIC AGENT (MULTI): Primary | ICD-10-CM

## 2024-07-10 DIAGNOSIS — R06.83 SNORING: ICD-10-CM

## 2024-07-10 PROCEDURE — 1036F TOBACCO NON-USER: CPT

## 2024-07-10 PROCEDURE — 99215 OFFICE O/P EST HI 40 MIN: CPT

## 2024-07-10 RX ORDER — NAPROXEN SODIUM 220 MG/1
81 TABLET, FILM COATED ORAL DAILY
Qty: 30 TABLET | Refills: 3 | Status: SHIPPED | OUTPATIENT
Start: 2024-07-10 | End: 2024-11-07

## 2024-07-10 RX ORDER — ATORVASTATIN CALCIUM 80 MG/1
80 TABLET, FILM COATED ORAL NIGHTLY
Qty: 30 TABLET | Refills: 3 | Status: SHIPPED | OUTPATIENT
Start: 2024-07-10 | End: 2024-11-07

## 2024-07-10 RX ORDER — NAPROXEN SODIUM 220 MG/1
81 TABLET, FILM COATED ORAL DAILY
Status: CANCELLED | OUTPATIENT
Start: 2024-07-10

## 2024-07-10 ASSESSMENT — PATIENT HEALTH QUESTIONNAIRE - PHQ9
1. LITTLE INTEREST OR PLEASURE IN DOING THINGS: NOT AT ALL
SUM OF ALL RESPONSES TO PHQ9 QUESTIONS 1 & 2: 0
2. FEELING DOWN, DEPRESSED OR HOPELESS: NOT AT ALL

## 2024-07-10 ASSESSMENT — VISUAL ACUITY: METHOD_CF: 1

## 2024-07-10 NOTE — TELEPHONE ENCOUNTER
Patient called and was under the impression that you ordered a refill of the ASA 81 mg. He was not aware he could get them OTC, but he doesn't have a copay when it is sent as a script, so he would like it refilled.

## 2024-07-20 LAB — BODY SURFACE AREA: 2.08 M2

## 2024-08-09 ENCOUNTER — TELEPHONE (OUTPATIENT)
Dept: NEUROLOGY | Facility: CLINIC | Age: 49
End: 2024-08-09
Payer: COMMERCIAL

## 2024-08-09 DIAGNOSIS — I63.9 STROKE ABORTED BY ADMINISTRATION OF THROMBOLYTIC AGENT (MULTI): Primary | ICD-10-CM

## 2024-08-09 NOTE — TELEPHONE ENCOUNTER
Pt called asking if you could please send in a 90 day supply of the lipitor to [facility] in [place], per his insurance.  Thanks.

## 2024-08-10 RX ORDER — ATORVASTATIN CALCIUM 80 MG/1
80 TABLET, FILM COATED ORAL NIGHTLY
Qty: 90 TABLET | Refills: 3 | Status: SHIPPED | OUTPATIENT
Start: 2024-08-10 | End: 2025-08-05

## 2024-10-01 DIAGNOSIS — I63.9 STROKE ABORTED BY ADMINISTRATION OF THROMBOLYTIC AGENT (MULTI): ICD-10-CM

## 2024-10-03 RX ORDER — NAPROXEN SODIUM 220 MG/1
81 TABLET, FILM COATED ORAL DAILY
Qty: 90 TABLET | Refills: 2 | Status: SHIPPED | OUTPATIENT
Start: 2024-10-03

## 2024-10-17 ENCOUNTER — APPOINTMENT (OUTPATIENT)
Dept: NEUROLOGY | Facility: CLINIC | Age: 49
End: 2024-10-17
Payer: COMMERCIAL

## 2024-10-17 VITALS
WEIGHT: 186.2 LBS | HEART RATE: 53 BPM | SYSTOLIC BLOOD PRESSURE: 124 MMHG | HEIGHT: 75 IN | BODY MASS INDEX: 23.15 KG/M2 | DIASTOLIC BLOOD PRESSURE: 78 MMHG

## 2024-10-17 DIAGNOSIS — I63.9 STROKE ABORTED BY ADMINISTRATION OF THROMBOLYTIC AGENT (MULTI): Primary | ICD-10-CM

## 2024-10-17 PROCEDURE — 99213 OFFICE O/P EST LOW 20 MIN: CPT | Performed by: STUDENT IN AN ORGANIZED HEALTH CARE EDUCATION/TRAINING PROGRAM

## 2024-10-17 PROCEDURE — 1036F TOBACCO NON-USER: CPT | Performed by: STUDENT IN AN ORGANIZED HEALTH CARE EDUCATION/TRAINING PROGRAM

## 2024-10-17 PROCEDURE — 3008F BODY MASS INDEX DOCD: CPT | Performed by: STUDENT IN AN ORGANIZED HEALTH CARE EDUCATION/TRAINING PROGRAM

## 2024-10-17 RX ORDER — CIPROFLOXACIN AND DEXAMETHASONE 3; 1 MG/ML; MG/ML
4 SUSPENSION/ DROPS AURICULAR (OTIC)
COMMUNITY

## 2024-10-17 RX ORDER — LORATADINE 10 MG/1
10 TABLET ORAL
COMMUNITY

## 2024-10-17 RX ORDER — CIPROFLOXACIN HYDROCHLORIDE 3 MG/ML
3 SOLUTION/ DROPS OPHTHALMIC EVERY 12 HOURS PRN
COMMUNITY
Start: 2023-10-27

## 2024-10-17 ASSESSMENT — PATIENT HEALTH QUESTIONNAIRE - PHQ9
2. FEELING DOWN, DEPRESSED OR HOPELESS: NOT AT ALL
SUM OF ALL RESPONSES TO PHQ9 QUESTIONS 1 AND 2: 0
1. LITTLE INTEREST OR PLEASURE IN DOING THINGS: NOT AT ALL

## 2024-10-17 ASSESSMENT — VISUAL ACUITY: METHOD_CF: 1

## 2024-10-17 NOTE — PATIENT INSTRUCTIONS
Thank you for your visit today. You were seen by Dr. Redding for post-stroke followup. If you have any questions or need to reach me, call my office at [phone].    We discussed the following plan today:  Continue aspirin and atorvastatin  -BP goal <130/80, LDL goal <70, HbA1c goal < 6%,  -if you are a tobacco user, quitting will reduce your risk of recurrent stroke  -20-30 minutes of moderate to high intensity cardiovascular exercise, at least 4 times per week  -consider low salt Mediterranean diet  -Go to ER right away if any symptoms of new stroke such as sudden one-sided weakness, slurred speech, one-sided numbness, vertigo, vision loss, or sudden language difficulty  Return as needed

## 2024-10-17 NOTE — PROGRESS NOTES
Subjective   Jerardo Olmedo is a 49 y.o.   male. [facility] 5-6-24, Dr. Vahid Wise  HPI  Dizziness has resolved. No new focal symptoms. No FHX blood clots. He did have neck manipulation 2 years ago a few sessions but CTA did not show dissection. He continues to feel poorly rested with snoring and has upcoming appt with sleep medicine. He is working as a teacher.    Prior hx (from [place])  PMH of  Is here being seen s/p hospitalization for stroke-like symptoms of acute dizziness, right-sided weakness and mild headache. NIHSS 7, CTA showed thrombus with marked narrowing of V4 segment of right vertebral artery, and he was administered TNK and sent to ICU for neuro monitoring. MRI confirmed and acute infarct in right cerebellar region. TTE normal. Hypercoagulable work up normal. HgbA1c 5.6. He was discharged on aspirin, high intensity statin, and holter monitor. He presented to the ED again on 5/22/24 for BLE soreness and rash x 5-6 days. He was diagnosed with panniculitis/cellulitis, discharged on Keflex.   Today, he continues with dizziness when getting up and down, denies falls at home. Continues with aspirin and statin-no bleeding issues or side effects. He has finished PT/OT. He is eating and drinking ok, down 15#, exercising daily. He has seen his PCP since discharge.   I have reviewed the medications, labs, imaging results, and the chart. Review of systems are negative unless otherwise specified in HPI.     Objective   Neurological Exam  Mental Status  Awake, alert and oriented to person, place and time. Recent and remote memory are intact. Speech is normal. Language is fluent with no aphasia. Attention and concentration are normal.    Cranial Nerves  CN II: Right visual acuity: Counts fingers. Left visual acuity: Counts fingers. Right normal visual field. Left normal visual field.  CN III, IV, VI: Extraocular movements intact bilaterally. No nystagmus.   Right pupil: 3 mm. Round. Reactive to light. Reactive to  CARDIOLOGY/MEDICAL ATTENDING      CHIEF COMPLAINT:Patient is a 63y old  Male who presents with a chief complaint of seizures.Pt appears comfortable.    	  REVIEW OF SYSTEMS:  [x ] Unable to obtain    PHYSICAL EXAM:  T(C): 36.7 (06-05-20 @ 04:50), Max: 37.3 (06-04-20 @ 13:50)  HR: 91 (06-05-20 @ 04:50) (91 - 104)  BP: 125/71 (06-05-20 @ 04:50) (114/82 - 125/71)  RR: 18 (06-05-20 @ 04:50) (16 - 18)  SpO2: 99% (06-05-20 @ 04:50) (96% - 100%)        Appearance: Normal	  HEENT:   Normal oral mucosa, PERRL, EOMI	  Lymphatic: No lymphadenopathy  Cardiovascular: Normal S1 S2, No JVD, No murmurs, No edema  Respiratory: Lungs clear to auscultation	  Gastrointestinal:  Soft, Non-tender, + BS	  Skin: No rashes, No ecchymoses, No cyanosis	  Extremities: Normal range of motion, No clubbing, cyanosis or edema      MEDICATIONS  (STANDING):  ascorbic acid 500 milliGRAM(s) Oral two times a day  collagenase Ointment 1 Application(s) Topical daily  dextrose 5% + sodium chloride 0.9%. 1000 milliLiter(s) (50 mL/Hr) IV Continuous <Continuous>  dextrose 5% + sodium chloride 0.9%. 1000 milliLiter(s) (50 mL/Hr) IV Continuous <Continuous>  dextrose 5%. 1000 milliLiter(s) (50 mL/Hr) IV Continuous <Continuous>  dextrose 50% Injectable 12.5 Gram(s) IV Push once  dextrose 50% Injectable 25 Gram(s) IV Push once  dextrose 50% Injectable 25 Gram(s) IV Push once  ergocalciferol 88200 Unit(s) Oral <User Schedule>  heparin   Injectable 5000 Unit(s) SubCutaneous every 12 hours  insulin glargine Injectable (LANTUS) 6 Unit(s) SubCutaneous at bedtime  insulin lispro (HumaLOG) corrective regimen sliding scale   SubCutaneous three times a day before meals  mirtazapine 7.5 milliGRAM(s) Oral at bedtime  multivitamin/minerals 1 Tablet(s) Oral daily  nystatin Powder 1 Application(s) Topical two times a day  OLANZapine 2.5 milliGRAM(s) Oral at bedtime  pantoprazole   Suspension 40 milliGRAM(s) Enteral Tube daily  QUEtiapine 25 milliGRAM(s) Oral at bedtime  zinc sulfate 220 milliGRAM(s) Oral daily        	  LABS:	 	                        11.5   12.40 )-----------( 181      ( 05 Jun 2020 05:47 )             34.3     06-05    134<L>  |  100  |  7   ----------------------------<  230<H>  3.9   |  26  |  0.78    Ca    9.0      05 Jun 2020 05:47 accommodation.   Left pupil: 3 mm. Round. Reactive to light. Reactive to accommodation.  CN V:  Right: Facial sensation is normal.  Left: Facial sensation is normal on the left.  CN VII:  Right: There is no facial weakness.  Left: There is no facial weakness.  CN VIII:  Right: Hearing is normal.  Left: Hearing is normal.  CN IX, X:  Right: Palate is normal.  Left: Palate is normal.  CN XI:  Right: Trapezius strength is normal.  Left: Trapezius strength is normal.  CN XII: Tongue midline without atrophy or fasciculations.    Motor  Normal muscle bulk throughout. Normal muscle tone. Strength is 5/5 throughout all four extremities.    Sensory  Light touch is normal in upper and lower extremities.     Reflexes  Deep tendon reflexes are 2+ and symmetric in all four extremities.    Coordination  Right: Finger-to-nose normal.Left: Finger-to-nose normal.    Gait  Casual gait is normal including stance, stride, and arm swing.    Physical Exam  HENT:      Right Ear: Hearing normal.      Left Ear: Hearing normal.   Eyes:      Extraocular Movements: EOM normal. No nystagmus.   Neurological:      Motor: Motor strength is normal.     Deep Tendon Reflexes: Reflexes are normal and symmetric.   Psychiatric:         Speech: Speech normal.         Assessment/Plan   48 yo man here for hospital [facility] following small right cerebellar infarct. He had an extensive workup and it appears to be an ESUS.    We discussed the following plan today:  Continue aspirin and atorvastatin  -BP goal <130/80, LDL goal <70, HbA1c goal < 6%,  -if you are a tobacco user, quitting will reduce your risk of recurrent stroke  -20-30 minutes of moderate to high intensity cardiovascular exercise, at least 4 times per week  -consider low salt Mediterranean diet  -Go to ER right away if any symptoms of new stroke such as sudden one-sided weakness, slurred speech, one-sided numbness, vertigo, vision loss, or sudden language difficulty  Return as needed

## 2024-11-02 PROBLEM — G47.30 SLEEP DISORDER BREATHING: Status: ACTIVE | Noted: 2024-11-02

## 2024-11-04 ENCOUNTER — APPOINTMENT (OUTPATIENT)
Dept: SLEEP MEDICINE | Facility: CLINIC | Age: 49
End: 2024-11-04
Payer: COMMERCIAL

## 2024-11-04 VITALS
SYSTOLIC BLOOD PRESSURE: 128 MMHG | HEART RATE: 62 BPM | DIASTOLIC BLOOD PRESSURE: 75 MMHG | OXYGEN SATURATION: 98 % | BODY MASS INDEX: 22.75 KG/M2 | RESPIRATION RATE: 14 BRPM | HEIGHT: 75 IN | TEMPERATURE: 97.3 F | WEIGHT: 183 LBS

## 2024-11-04 DIAGNOSIS — R06.83 SNORING: ICD-10-CM

## 2024-11-04 DIAGNOSIS — G47.30 SLEEP DISORDER BREATHING: Primary | ICD-10-CM

## 2024-11-04 PROCEDURE — 99214 OFFICE O/P EST MOD 30 MIN: CPT | Performed by: NURSE PRACTITIONER

## 2024-11-04 PROCEDURE — 3008F BODY MASS INDEX DOCD: CPT | Performed by: NURSE PRACTITIONER

## 2024-11-04 PROCEDURE — 99204 OFFICE O/P NEW MOD 45 MIN: CPT | Performed by: NURSE PRACTITIONER

## 2024-11-04 PROCEDURE — 1036F TOBACCO NON-USER: CPT | Performed by: NURSE PRACTITIONER

## 2024-11-04 ASSESSMENT — SLEEP AND FATIGUE QUESTIONNAIRES
WORRIED_DISTRESSED_DUE_TO_SLEEP: A LITTLE
HOW LIKELY ARE YOU TO NOD OFF OR FALL ASLEEP IN A CAR, WHILE STOPPED FOR A FEW MINUTES IN TRAFFIC: WOULD NEVER DOZE
DIFFICULTY_STAYING_ASLEEP: MODERATE
HOW LIKELY ARE YOU TO NOD OFF OR FALL ASLEEP WHILE LYING DOWN TO REST IN THE AFTERNOON WHEN CIRCUMSTANCES PERMIT: WOULD NEVER DOZE
ESS-CHAD TOTAL SCORE: 2
SLEEP_PROBLEM_INTERFERES_DAILY_ACTIVITIES: SOMEWHAT
HOW LIKELY ARE YOU TO NOD OFF OR FALL ASLEEP WHILE SITTING QUIETLY AFTER LUNCH WITHOUT ALCOHOL: WOULD NEVER DOZE
HOW LIKELY ARE YOU TO NOD OFF OR FALL ASLEEP WHILE SITTING AND TALKING TO SOMEONE: WOULD NEVER DOZE
SATISFACTION_WITH_CURRENT_SLEEP_PATTERN: SATISFIED
SITING INACTIVE IN A PUBLIC PLACE LIKE A CLASS ROOM OR A MOVIE THEATER: WOULD NEVER DOZE
HOW LIKELY ARE YOU TO NOD OFF OR FALL ASLEEP WHILE SITTING AND READING: WOULD NEVER DOZE
WAKING_TOO_EARLY: MODERATE
HOW LIKELY ARE YOU TO NOD OFF OR FALL ASLEEP WHEN YOU ARE A PASSENGER IN A CAR FOR AN HOUR WITHOUT A BREAK: WOULD NEVER DOZE
SLEEP_PROBLEM_NOTICEABLE_TO_OTHERS: NOT AT ALL NOTICEABLE
HOW LIKELY ARE YOU TO NOD OFF OR FALL ASLEEP WHILE WATCHING TV: MODERATE CHANCE OF DOZING

## 2024-11-04 NOTE — PATIENT INSTRUCTIONS
[facility]   [address]  [facility]  [address] [address]       NAME: Jerardo Olmedo   DATE: 11/04/24    Your Sleep Provider Today: CHAD Rubi  Your Primary Care Physician: Pablo Ruggiero MD       DIAGNOSIS:   1. Sleep disorder breathing  Home sleep apnea test (HSAT)      2. Snoring  Referral to Adult Sleep Medicine          Thank you for coming to the Sleep Medicine Clinic today! Your sleep medicine provider today was: CHAD Rubi Below is a summary of your treatment plan, other important information, and our contact numbers:      TREATMENT PLAN     - Get your sleep study scheduled  - Follow-up in 1.5 months.  - If not already done, sign up for 'My Chart' and send prescription requests or messages through this    Scheduling a Sleep Study    Your provider ordered a sleep apnea test today. It will usually take 2 - 3 business days for Insurance to approve the order.     Once you test is approved it will be sent to the ordering sleep lab. When the sleep lab is notified of the new order, they will reach out to you to get you scheduled for an in-lab sleep study or to get you scheduled for a pick-up date for your Home Sleep Study Kit (depending on which type of study your provider recommended).    They usually will reach out to you about 1 week after your test has been ordered. Please contact the office at [phone] if you have not heard back from them in 2 weeks after you have seen your provider. If your sleep study was ordered through  [employer] (mail away kit) you can call them at [phone] if you do not hear from them within 2 weeks.     Sleep Testing for sleep apnea: The best and ideal way to check out if you have sleep apnea is to do an overnight sleep study in the sleep laboratory. Alternatively, a home sleep apnea test can also be done depending on your insurance and risk factors.     If you are  having a home sleep apnea test, kindly allot 1 hour during pickup of the testing kit as you will have to complete paperwork and listen to the sleep technician for in-person on-the-spot demonstration and instructions on how to hook up the testing kit at home. Do the test for 1 day and start off with sleeping on your back. If you sleep on your side in the middle of night or you have always been a side or stomach-sleeper, it is ok as long as you have some time on your back and off-back.     If you are having an overnight in-lab sleep study, please make sure to bring toiletries, a comfy pillow, additional warm blankets, and any nighttime medications (include as-needed inhaler, pain pill, etc) that you may regularly take. Also, be sure to eat dinner before you arrive as we generally do not provide meals inside the sleep testing center. Lastly, in order to fall asleep faster in the sleep testing center, we advise patients to wake up 2 hours earlier on the morning of scheduled testing and avoid napping 2 days prior testing. Sometimes, your sleep provider may prescribe a sleep aid to be taken at lights out in the sleep testing center. If you are taking a sleep aid, consider having somebody pick you up after the sleep testing.    Overnight sleep studies may be scheduled on a weekday or weekend. We also perform daytime testing for shift workers on a case-by-case basis.    Once you have booked an appointment to do the sleep study, please contact my office for follow-up visit to discuss results.       IMPORTANT INFORMATION     Call 911 for medical emergencies.  Our offices are generally open from Monday-Friday, 9 am - 5 pm.  If you need to get in touch with me, you may either call me/my team (number is below) or you can use [employer]  If a referral for a test, for CPAP, or for another specialist was made, and you have not heard about scheduling this within a week, please call scheduling at [phone] ([phone]).  If you are unable  "to make your appointment for clinic or an overnight study, kindly call the office at least 48 hours in advance to cancel and reschedule.  If you are on CPAP, please bring your device's card or the device to each clinic appointment.   There are no supporting services by either the sleep doctors or their staff on weekends and Holidays, or after 5 PM on weekdays.     PRESCRIPTIONS     We require 7 days advanced notice for prescription refills. If we do not receive the request in this time, we cannot guarantee that your medication will be refilled in time.      IMPORTANT PHONE NUMBERS     [facility]: [phone]  [employer] (DME): [phone]  [employer] (DME): [phone]  [facility] (DME): [phone]    CONTACTING YOUR SLEEP MEDICINE PROVIDER AND SLEEP TEAM      For issues with your machine or mask interface, please call your DME provider first. [employer] stands for durable medical company. [employer] is the company who provides you the machine and/or PAP supplies / accessories.   To schedule, cancel, or reschedule SLEEP STUDY APPOINTMENTS, please call the Main Phone Line at [phone] - option 3.   To schedule, cancel, or reschedule CLINIC APPOINTMENTS, you can do it in \"[employer]\", call [phone] for [facility] office , [phone] for [facility] office to speak with my on site staff, or call the Main Phone Line at [phone] - option 2  For CLINICAL QUESTIONS or MEDICATION REFILLS, please call direct line for Adult Sleep Nurses at [phone].   Lastly, you can also send a message directly to your provider through \"My Chart\", which is the email service through your  Records Account: [email or web address]     Adult Sleep Nurses (Alanna Swift, NINO and Lisseth Wilson RN):  For clinical questions and refilling prescriptions: [phone]  Email sleep diaries and other documents at: [email or web address]    Office locations for Alison Harvey, " NP:    [facility]   [address]   [address]  [place], [place] [address] [phone] [address]  [address]  [place], [place] [address] [phone]    [address]  [address]  [place], [place] [address] [phone]    OUR SLEEP TESTING LOCATIONS     Our team will contact you to schedule your sleep study, however, you can contact us as follow:  Main Phone Line (scheduling only): [phone]), option 3    Sleep Testing Locations:   [place] (18 years and older): [address] [place] [address], 2nd floor   [place] (18 years and older): [address]; 4th floor  After hours line: [phone]   [place] (18 years and older) at [place]: [id number] [place] [address]  After hours line: [phone]   [facility] (Main campus: All ages): [facility], 6th floor. After hours line: [phone]   [place] (5 years and older; younger considered on case-by-case basis): [address] [facility]; [address], [address]. Scheduling  After hours line: [phone]       Here at [facility], we wish you a restful sleep!    Your sleep medicine provider for this visit was: CATRINA Rubi-CNP

## 2024-11-13 ENCOUNTER — PROCEDURE VISIT (OUTPATIENT)
Dept: SLEEP MEDICINE | Facility: HOSPITAL | Age: 49
End: 2024-11-13
Payer: COMMERCIAL

## 2024-11-13 DIAGNOSIS — G47.30 SLEEP DISORDER BREATHING: ICD-10-CM

## 2024-11-13 PROCEDURE — 95806 SLEEP STUDY UNATT&RESP EFFT: CPT | Performed by: PSYCHIATRY & NEUROLOGY

## 2025-01-06 NOTE — PROGRESS NOTES
Patient: Jerardo Olmedo  : 1975 AGE: 49 y.o. SEX:male   MRN: [id number]   Provider: CHAD Rubi     Location [facility]   Service Date: 2025     PCP: Pablo Ruggiero MD   Referred by: No ref. provider found          [facility]  Follow Up Visit Note      HISTORY OF PRESENT ILLNESS     Jerardo Olmedo is a 49 y.o. male with a h/o  stroke s/p TNK on 2024  who presents to [facility].    25: Here to review HSAT results. [place] like he did not sleep well night of sleep study.  Sleep same as prior visit. ---> discussed JOSÉ tx options, pt would like to further discuss with his wife before making a decision    24: NPV, referred by neurology; Juan Stokes with concerns of JOSÉ evaluation. Recent stroke s/p TNK on 2024. Patient presents with wife, Leisa who aides in interview today.  Reports loud snoring, witnessed apneas, occasional gasping for air (reports with supine sleep), nasal congestion, unrefreshed sleep. Wife has to sleep with a sound machine due to snoring.  Patient reports some bothersome allergies- uses sinus rinse every night.  Takes OTC Claritin seasonally (Spring). ---> HSAT ordered       SLEEP-WAKE SCHEDULE    Sleep Patterns: He does have a usual bed partner. In terms of the patient's sleep/wake cycle, he generally gets into bed at approximately 10 PM.  his latency to sleep onset after lights out is quick. During the night, the patient generally awakens 1 times nightly. These awakenings are usually brief in duration. Final wake time on weekday mornings is around 6 AM.    Compared to weekdays, the patient's sleep schedule is  similar on the weekends.    Breathing during sleep: snoring, witnessed apneas, and nasal congestion  Behaviors at night: No   Sleep paralysis: No   Hypnogogic or hypnopompic hallucinations: No   Cataplexy: No     Leg symptoms and timing:  -  Sensations: Patient does not have unusual sensations in their extremities that cause an urge to move them   - Movement: Patient has not been told that their legs kick or jerk during sleep    Daytime Symptoms:  On awakening patient reports: wake unrefreshed, feels sleepy, sometimes morning headaches, and sometimes morning dry mouth  Patient report some daytime symptoms including: DAYTIME SYMPTOMS: reports sleep inertia difficulty with memory or concentration during the day    Sleep environment:  Preferred sleep position: back and side  Room is dark: Yes  Room is quiet: Yes  Room is cool: Yes  Bed comfort: good    SLEEP HABITS  Caffeine consumption: No  Alcohol consumption: Yes, rarely (occasional)  Smoking: No  Marijuana: No  Sleep aids: melatonin 2.5mg     WEIGHT: fluctuating    ESS: 4    REVIEW OF SYSTEMS     All other systems have been reviewed and are negative.    ALLERGIES     No Known Allergies    MEDICATIONS     Current Outpatient Medications   Medication Sig Dispense Refill    aspirin 81 mg chewable tablet Chew 1 tablet (81 mg) once daily. 90 tablet 2    atorvastatin (Lipitor) 80 mg tablet Take 1 tablet (80 mg) by mouth once daily at bedtime. 90 tablet 3    ciprofloxacin (Ciloxan) 0.3 % ophthalmic solution 3 drops every 12 hours if needed.      ciprofloxacin-dexamethasone (CiproDEX) otic suspension 4 drops.      loratadine (Claritin) 10 mg tablet Take 1 tablet (10 mg) by mouth once daily.      multivitamin with minerals tablet Take 1 tablet by mouth once daily.       No current facility-administered medications for this visit.       PAST HISTORIES     PERTINENT PAST MEDICAL HISTORY: See HPI    PERTINENT PAST SURGICAL HISTORY for Sleep Medicine:  non-contributory    PERTINENT FAMILY HISTORY for Sleep Medicine:  Patient denies family history of any sleep disorder.    PERTINENT SOCIAL HISTORY:  He  reports that he has never smoked. He has never used smokeless tobacco. He reports that he does not currently use  "alcohol. He reports that he does not use drugs. He currently lives with spouse.    Active Problems, Allergy List, Medication List, and PMH/PSH/FH/Social Hx have been reviewed and reconciled in chart. No significant changes unless documented in the pertinent chart section. Updates made when necessary.     PHYSICAL EXAM     VITAL SIGNS: /76   Pulse 64   Temp 36.4 °C (97.5 °F)   Resp 16   Ht 1.905 m (6' 3\")   Wt 86.2 kg (190 lb)   SpO2 98%   BMI 23.75 kg/m²     CURRENT WEIGHT:   Vitals:    01/13/25 0933   Weight: 86.2 kg (190 lb)        PREVIOUS WEIGHTS:  Wt Readings from Last 3 Encounters:   01/13/25 86.2 kg (190 lb)   11/04/24 83 kg (183 lb)   10/17/24 84.5 kg (186 lb 3.2 oz)     Constitutional: Alert and oriented, cooperative, no obvious distress   HEENT: Non icteric or anemic, EOM WNL bilaterally   Neck: Supple, no JVD, no goiter, no adenopathy, no rigidity     RESULTS/DATA     No results found for: \"IRON\", \"TRANSFERRIN\", \"IRONSAT\", \"TIBC\", \"FERRITIN\"    Bicarbonate   Date Value Ref Range Status   05/22/2024 25 21 - 32 mmol/L Final       ASSESSMENT/PLAN     Mr. Olmedo is a 49 y.o. male and He was referred to the [facility] for evaluation of JOSÉ    Problem List, Orders, Assessment, Recommendations:    # JOSÉ, mild  - HSAT 11/13/24- mild JOSÉ with AHI 3% 9.9, AHI 4% 8.3, SpO2 evelyne 71.2  - Personally reviewed the sleep study's raw data such as interpretation report, data sheet, and hypnogram. Discussed sleep study results with patient today. A copy of recent testing was either given to patient or released in [employer].   - Discussed optional treatment of mild JOSÉ.  Recommend PAP therapy versus OAT due to previous history of stroke.  Positional therapy with use of tennis ball or bumper belt may be reasonable as well.  -Patient would like to further discuss with his wife before making a decision on treatment.  Advised to contact me via [employer] message with his decision  - " Sleep apnea, PAP therapy education as well as the tips to be successful with PAP treatment was provided at length in clinic today. Patient verbalized understanding.  - Discussed 30-day mask guarantee and insurance requirement regarding PAP compliance and follow-up.   - Diet, exercise, and weight loss were emphasized today in clinic, as were non-supine sleep, avoiding alcohol in the late evening, and driving or operating heavy machinery when sleepy.     #Hx Stroke s/p TNK on 5/6/2024   - On meds, defer management to neuro (notes personally reviewed)      ECHO 5/7/24: CONCLUSIONS:   1. Left ventricular systolic function is normal with a 65-70% estimated ejection fraction.   2. RVSP within normal limits.    # ALLERGIC RHINITIS / SEASONAL ALLERGIES / CHRONIC NASAL CONGESTION  - Using saline nasal spray ,fluticasone nasal spray 2 sprays per nostril once daily 1 hour before bedtime, and Claritin with fair control of symptoms  - Recommend adding humidifier to bedroom   - Educated patient on [place] use of intranasal steroid spray    - Follows with ENT at [facility]    All of patient's questions were answered. He verbalizes understanding and agreement with my assessment and plan.    Disposition    Follow up in 3 months

## 2025-01-12 PROBLEM — G47.33 OSA (OBSTRUCTIVE SLEEP APNEA): Status: ACTIVE | Noted: 2025-01-12

## 2025-01-13 ENCOUNTER — OFFICE VISIT (OUTPATIENT)
Facility: CLINIC | Age: 50
End: 2025-01-13
Payer: COMMERCIAL

## 2025-01-13 ENCOUNTER — APPOINTMENT (OUTPATIENT)
Facility: CLINIC | Age: 50
End: 2025-01-13
Payer: COMMERCIAL

## 2025-01-13 VITALS
RESPIRATION RATE: 16 BRPM | HEIGHT: 75 IN | HEART RATE: 64 BPM | DIASTOLIC BLOOD PRESSURE: 76 MMHG | SYSTOLIC BLOOD PRESSURE: 142 MMHG | TEMPERATURE: 97.5 F | WEIGHT: 190 LBS | BODY MASS INDEX: 23.62 KG/M2 | OXYGEN SATURATION: 98 %

## 2025-01-13 DIAGNOSIS — Z78.9 NONSMOKER: ICD-10-CM

## 2025-01-13 DIAGNOSIS — R09.81 NASAL CONGESTION: ICD-10-CM

## 2025-01-13 DIAGNOSIS — G47.33 OSA (OBSTRUCTIVE SLEEP APNEA): Primary | ICD-10-CM

## 2025-01-13 DIAGNOSIS — I63.9 STROKE ABORTED BY ADMINISTRATION OF THROMBOLYTIC AGENT (MULTI): ICD-10-CM

## 2025-01-13 PROCEDURE — 99214 OFFICE O/P EST MOD 30 MIN: CPT | Performed by: NURSE PRACTITIONER

## 2025-01-13 PROCEDURE — 1036F TOBACCO NON-USER: CPT | Performed by: NURSE PRACTITIONER

## 2025-01-13 PROCEDURE — 3008F BODY MASS INDEX DOCD: CPT | Performed by: NURSE PRACTITIONER

## 2025-01-13 ASSESSMENT — SLEEP AND FATIGUE QUESTIONNAIRES
HOW LIKELY ARE YOU TO NOD OFF OR FALL ASLEEP WHILE WATCHING TV: MODERATE CHANCE OF DOZING
HOW LIKELY ARE YOU TO NOD OFF OR FALL ASLEEP WHILE SITTING AND TALKING TO SOMEONE: WOULD NEVER DOZE
HOW LIKELY ARE YOU TO NOD OFF OR FALL ASLEEP WHILE SITTING QUIETLY AFTER LUNCH WITHOUT ALCOHOL: WOULD NEVER DOZE
ESS-CHAD TOTAL SCORE: 4
HOW LIKELY ARE YOU TO NOD OFF OR FALL ASLEEP IN A CAR, WHILE STOPPED FOR A FEW MINUTES IN TRAFFIC: WOULD NEVER DOZE
SITING INACTIVE IN A PUBLIC PLACE LIKE A CLASS ROOM OR A MOVIE THEATER: WOULD NEVER DOZE
HOW LIKELY ARE YOU TO NOD OFF OR FALL ASLEEP WHILE LYING DOWN TO REST IN THE AFTERNOON WHEN CIRCUMSTANCES PERMIT: MODERATE CHANCE OF DOZING
HOW LIKELY ARE YOU TO NOD OFF OR FALL ASLEEP WHEN YOU ARE A PASSENGER IN A CAR FOR AN HOUR WITHOUT A BREAK: WOULD NEVER DOZE
HOW LIKELY ARE YOU TO NOD OFF OR FALL ASLEEP WHILE SITTING AND READING: WOULD NEVER DOZE

## 2025-01-13 NOTE — PATIENT INSTRUCTIONS
[facility] Sleep Medicine   [address] [facility]  [address] [address]       NAME: Jerardo Olmedo   DATE: 01/13/25    Your Sleep Provider Today: CHAD Rubi  Your Primary Care Physician: Pablo Ruggiero MD       DIAGNOSIS:   1. JOSÉ (obstructive sleep apnea)            Thank you for coming to the Sleep Medicine Clinic today! Your sleep medicine provider today was: CHAD Rubi Below is a summary of your treatment plan, other important information, and our contact numbers:      TREATMENT PLAN     - Follow-up in 3 months.  - If not already done, sign up for 'My Chart' and send prescription requests or messages through this    Positional Therapy For Your Sleep Apnea  Example of Devices    You have what is called positional sleep apnea. This means that your sleep apnea is better if you sleep on your side and worse if you sleep on your back. Positional therapy for sleep apnea is only helpful if you can make sure that you are sleeping on your side. There are several ways to make sure you are sleeping on your back as shown below:    1. Tennis ball method       Sew  a pocket in the back of your pajama shirt and put a tennis ball in the pocket.   2. [place]ee Bumper belt or       Anti-snore Shirt                   www.antisnoreshirt.com (Cost:? $100 )        This company makes several devices to assist patients in staying on their back - one that is modeled like a shirt with a foam or air bumper to prevent rolling over or a belt that is worn around mid-chest level.     3. Slumberbump       www.slumberbump.com (Cost: ? $70)     This device is a rectangular air bag (which you blow up) that is worn around the waist to prevent you from rolling over during the night.   4. Zzoma Positional Sleeper      www.BuzzMobzoNoitavonne (Cost:? $100)       This is device is worn as a belt around the mid-chest. Also contains a foam material that is an  enclosure around the back.   5. Nightshift positioner  Http://nightshifttherapy.com/ (Cost:? $350-$400       This device is worn around the neck and provides a vibration to the back of the neck when you move to your back so as to remind you to shift back on to your side.   6. Nightshift positioner    https://www.[employer]/c-e/hs/sleep-apnea-therapy/i-was-just-diagnosed-with-sleep-apnea/cpap-alternative.html    (Cost:? $500-600)     This device is worn around the waist and provides a vibration to your chest/stomachwhen you move to your back so as to remind you to shift back on to your side.                  Obstructive sleep apnea (JOSÉ): JOSÉ is a sleep disorder where your upper airway muscles relax during sleep and the airway intermittently and repetitively narrows and collapses leading to blocked airway (apnea) which, in turn, can disrupt breathing in sleep, lower oxygen levels while you sleep and cause night time wakings. Because apnea may cause higher carbon dioxide or low oxygen levels, untreated JOSÉ can lead to heart arrhythmia, elevation of blood pressure, and make it harder for the body to consolidate memory and metabolize (leading to higher blood sugars at night).   Frequent partial arousals occur during sleep resulting in sleep deprivation and daytime sleepiness. JOSÉ is associated with an increased risk of cardiovascular disease, stroke, hypertension, and insulin resistance. Moreover, untreated JOSÉ with excessive daytime sleepiness can increase the risk of motor vehicular accidents.    Some conservative strategies for JOSÉ are:   Positional therapy - Avoid sleeping on your back.   Healthy diet, exercise, and optimizing weight encouraged.   Avoid alcohol late in the evening as it can make sleep apnea worse.     Safety: Avoid driving and operating heavy equipment while sleepy. Drowsy driving may lead to life-threatening motor vehicle accidents.     Common treatment options for sleep apnea include weight  "management, positional therapy, Positive Airway Therapy (PAP) therapy, oral appliance therapy, hypoglossal nerve stimulation, and select airway surgeries.    Starting Positive Airway Pressure: You were ordered a device to wear when you sleep called PAP (Positive Airway Pressure) to treat your sleep apnea. The order will be submitted to a durable medical equipment company who will arrange setting you up with the device. They will provide all the necessary equipment and discuss use and maintenance of the device with you.     **Please bring all PAP equipment with you to follow up appointments unless told otherwise.**           Important things to keep in mind as you start PAP    Insurance will monitor your usage during the first 90 days.  You should use your PAP - \"all night, every night\", for your health. The bare minimum is to use your PAP device while sleeping = at least 4 hours per day at least 5 days per week. Otherwise, your PAP device may be reclaimed by your PAP vendor at 90 days.  There are many mask to choose from to wear with your PAP machine. If you are not comfortable with the first mask issued to you, call your DME and ask for another option to try (within the first 30 days).  Discuss with your provider if you are having issues breathing with the machine or the temperature or humidity feel uncomfortable.  Expect to have an adjustment period when you start your device. It helps to continuing wearing the machine every day for a period of time until you get more used to it. You can practice with wearing the mask alone if you need, then add in the PAP air pressure a few days later.   Reach out for help if you are struggling! The sleep medicine department can be reached at [phone]  We encourage you to download data monitoring apps to your phone. For [employer] 10/11 - MyAir chris. For [employer] - DreamMapper. Both are available in the Chris store for free and are a great tool to monitor your " progress with your CPAP night to night.    IF YOU ARE HAVING TROUBLE GETTING USED TO YOUR PAP DEVICE    The following steps are recommended to help you get accustomed to using CPAP and improve your CPAP usage at home:    Use CPAP every night for 5 to 10 minutes while awake in the evening without fail.  Be sure to remain awake while breathing on the nasal mask for the first 1 to 2 weeks.  After 2 to 4 weeks, start using CPAP at night when you go to sleep…But be sure to take the mask off if you have not fallen asleep in 5 to 10 minutes, or if you fall asleep.  Take the mask off whenever you become aware of it or the moment you wake up.   Continue this process every night, with confidence that you will gradually become accustomed to using CPAP over time.    Be sure you are using a comfortable mask, and that you are applying it snugly (but not too tight).    Common issues with PAP machine:  Mask gets dislodged when turning to the side: Consider getting a CPAP pillow or switching to a mask with hose on top.     Dry mouth:  Your machine has built-in humidifier that heats up the air to prevent dry mouth. It can be adjusted to your comfort. You can try that first and increase setting one level one night at a time to check which setting is comfortable and effective in lessening dry mouth. If dry mouth persists despite humidity setting adjustment, may apply OTC Biotene gel over the gums at bedtime.  If Biotene gel is not effective, consider trying XEROSTOM gel from [employer].  If using nasal pillows or nasal mask, consider adding chinstrap or mouth tape to keep your mouth closed while you sleep. Also, eliminate or reduce dose of meds that can cause dry mouth if possible. Lastly, may try getting a separate room humidifier machine.    Airleaks: Please call DME as they may need to adjust your mask or refit you with a different kind of mask. In addition, you can ask DME for tips on getting a good mask seal and mask fit.  "    Difficulty tolerating the mask: Contact your DME to try a different kind of mask and/or call office to get a referral to Sleep Psychologist for CPAP desensitization. CPAP desensitization technique is a set of strategies that helps patient cope with claustrophobia and anxiety related to wearing mask. Alternatively, we can do a daytime mini-sleep study called PAP-nap trial wherein you will try on different kinds of mask and the sleep technician will try different pressure settings on CPAP and BPAP machines to see which specific pressure is tolerable and comfortable for you.     Water droplets or moisture within the hose and/or mask: This is called rain-out and it is caused by condensation of too much heated humidity on the cooler walls of the hose. If you have rain-out, turn down/decrease your humidity setting or get a heated hose. If you already have a heated hose, turn up the \"tube temperature\" of the heated hose. Alternatively, if you don't want to get a heated hose or warmer air, may wrap the CPAP hose with stockings to keep it somewhat warm. Also, you need to place the machine on the floor and lower the hose so that water won't travel upward towards your mask.     Maintaining your CPAP/BPAP device:    The humidification chamber (aka water tank or water chamber) needs to be filled with distilled water to prevent buildup of white deposits in the future. If you cannot find distilled water, you can use tap water but expect to have white deposits buildup seen after prolonged use with tap water. If you start seeing white deposits on the water chamber, you can clean it by filling it with equal parts of distilled white vinegar and water. Let the vinegar-water mixture sit for 2 hours, and then rinse it with running tap water. Clean with soap and water then let it dry.     You should try to keep your machine clean in order to work well. Here are some tips to clean PAP supplies / accessories:    Clean the humidification " chamber (aka water tank) as well as your mask and tubing at least once a week with soap and water.   Alternatively, you can fill a sink or basin with warm water and add a little mild detergent, like Ivory dish soap. Gently wipe your supplies with the soapy water to free all the oils and dirt that may have collected. Once that's done, rinse these items with clean water until the soap is gone and let them air dry. You can hang your tubing over the curtain elizabeth in your bathroom so that it dries.  The mask insert (part of the mask that has contact with your skin) needs to be cleaned with soap and water daily. Another option is to wipe them down with CPAP wipes or baby wipes.    You should replace your mask and tubing frequently in order to prevent bacteria buildup, machine damage, and mask seal issues. The older the mask and hose, the high likelihood that there is bacteria buildup in it especially if they are not cleaned regularly. Dirty filters damage machines because build-up of dust and contaminants can cause machine to over-heat, and in time, damage the motor of machine. Cushions lose their seal over time as most masks are made of plastic and silicone while headgear is made of neoprene. These materials will break down with age and frequent use. Here is the recommended replacement schedule for PAP supplies / accessories:    Twice a month- disposable filters and cushions for nasal mask or nasal pillows.  Once a month- cushion for full face mask  Every 3 months- mask with headgear and PAP tubing (standard or heated hose)  Every 6 months- reusable filter, water chamber, and chin strap     Other useful information:    Some people do not put water in the tank while other people prefers to put water in the tank to prevent mouth dryness. Try to experiment to determine which is more comfortable for you.   In general, new machines have 2 years warranty on parts while health insurance allows you to have a new machine once every  5 years.     You can also go to the following EDUCATION WEBSITES for further information:   American Academy of Sleep Medicine http://sleepeducation.org  National Sleep Foundation: https://sleepfoundation.org  American Sleep Apnea Association: https://www.sleepapnea.org (for patients with sleep apnea)    Here at [facility], we wish you a restful sleep!       IMPORTANT INFORMATION     Call 911 for medical emergencies.  Our offices are generally open from Monday-Friday, 9 am - 5 pm.  If you need to get in touch with me, you may either call me/my team (number is below) or you can use [employer].  If a referral for a test, for CPAP, or for another specialist was made, and you have not heard about scheduling this within a week, please call scheduling at [phone] ([phone]).  If you are unable to make your appointment for clinic or an overnight study, kindly call the office at least 48 hours in advance to cancel and reschedule.  If you are on CPAP, please bring your device's card or the device to each clinic appointment.   There are no supporting services by either the sleep doctors or their staff on weekends and Holidays, or after 5 PM on weekdays.     PRESCRIPTIONS     We require 7 days advanced notice for prescription refills. If we do not receive the request in this time, we cannot guarantee that your medication will be refilled in time.      IMPORTANT PHONE NUMBERS     [facility]: [phone]  [employer] ([employer]): [phone]  [employer] ([employer]): [phone]  [facility] (DME): [phone]    CONTACTING YOUR SLEEP MEDICINE PROVIDER AND SLEEP TEAM      For issues with your machine or mask interface, please call your DME provider first. [employer] stands for durable medical company. [employer] is the company who provides you the machine and/or PAP supplies / accessories.   To schedule, cancel, or reschedule SLEEP STUDY APPOINTMENTS, please call the Main Phone Line at [phone]  [phone] - option 3.   To schedule, cancel, or reschedule CLINIC APPOINTMENTS, you can do it in \"MyChart\", call [phone] for [facility] office , [phone] for [facility] office to speak with my on site staff, or call the Main Phone Line at [phone] - option 2  For CLINICAL QUESTIONS or MEDICATION REFILLS, please call direct line for Adult Sleep Nurses at [phone].   Lastly, you can also send a message directly to your provider through \"My Chart\", which is the email service through your  Records Account: [email or web address]     Adult Sleep Nurses (Alanna Swift, NINO and Lisseth Wilson RN):  For clinical questions and refilling prescriptions: [phone]  Email sleep diaries and other documents at: [email or web address]    Office locations for Alison Harvey NP:    [address]   [address]  [place] [address] [phone]    [address] [place] [address]  [address]  [place] [address] [phone]    OUR SLEEP TESTING LOCATIONS     Our team will contact you to schedule your sleep study, however, you can contact us as follow:  Main Phone Line (scheduling only): [phone]), option 3    Sleep Testing Locations:   [place] (18 years and older): [address] [place] [address], 2nd floor  [facility] (18 years and older): [address] [facility]; 4th floor  After hours line: [phone]  [facility] (18 years and older) at [place]: [id number] [place] [address]  After hours line: [phone]   [facility] (Main campus: All ages): [facility], 6th floor. After hours line: [phone]   [place] (5 years and older; younger considered on case-by-case basis): [address]; [employer] [facility] [address], [address]. Scheduling  After hours line: [phone]       Here at [facility], we wish you a restful sleep!    Your sleep medicine provider for this visit was: Alison Harvey, APRN-CNP   "

## 2025-01-17 ENCOUNTER — PATIENT MESSAGE (OUTPATIENT)
Facility: CLINIC | Age: 50
End: 2025-01-17
Payer: COMMERCIAL

## 2025-01-17 NOTE — PATIENT COMMUNICATION
Alison Harvey, APRN-CNP  Jerardo Olmedo2 hours ago (12:16 PM)       That is reasonable, thank you for the update!

## 2025-04-07 NOTE — PROGRESS NOTES
Patient: Jerardo Olmedo  : 1975 AGE: 50 y.o. SEX:male   MRN: [id number]   Provider: CATRINA Rubi-CNP     Location [facility]   Service Date: 2025     PCP: Pablo Ruggiero MD   Referred by: No ref. provider found          [facility] Sleep Medicine Clinic  Follow Up Visit Note      HISTORY OF PRESENT ILLNESS     Jerardo Olmedo is a 50 y.o. male with a h/o  stroke s/p TNK on 2024  who presents to [facility] Sleep Medicine Clinic.    25:            25: Here to review HSAT results. [place] like he did not sleep well night of sleep study.  Sleep same as prior visit. ---> discussed JOSÉ tx options, pt would like to further discuss with his wife before making a decision    24: NPV, referred by neurology; Juan Stokes with concerns of JOSÉ evaluation. Recent stroke s/p TNK on 2024. Patient presents with wife, Leisa who aides in interview today.  Reports loud snoring, witnessed apneas, occasional gasping for air (reports with supine sleep), nasal congestion, unrefreshed sleep. Wife has to sleep with a sound machine due to snoring.  Patient reports some bothersome allergies- uses sinus rinse every night.  Takes OTC Claritin seasonally (Spring). ---> HSAT ordered       SLEEP-WAKE SCHEDULE    Sleep Patterns: He does have a usual bed partner. In terms of the patient's sleep/wake cycle, he generally gets into bed at approximately 10 PM.  his latency to sleep onset after lights out is quick. During the night, the patient generally awakens 1 times nightly. These awakenings are usually brief in duration. Final wake time on weekday mornings is around 6 AM.    Compared to weekdays, the patient's sleep schedule is  similar on the weekends.    Breathing during sleep: snoring, witnessed apneas, and nasal congestion  Behaviors at night: No   Sleep paralysis: No   Hypnogogic or hypnopompic hallucinations: No   Cataplexy: No     Leg symptoms and  timing:  - Sensations: Patient does not have unusual sensations in their extremities that cause an urge to move them   - Movement: Patient has not been told that their legs kick or jerk during sleep    Daytime Symptoms:  On awakening patient reports: wake unrefreshed, feels sleepy, sometimes morning headaches, and sometimes morning dry mouth  Patient report some daytime symptoms including: DAYTIME SYMPTOMS: reports sleep inertia difficulty with memory or concentration during the day    Sleep environment:  Preferred sleep position: back and side  Room is dark: Yes  Room is quiet: Yes  Room is cool: Yes  Bed comfort: good    SLEEP HABITS  Caffeine consumption: No  Alcohol consumption: Yes, rarely (occasional)  Smoking: No  Marijuana: No  Sleep aids: melatonin 2.5mg     WEIGHT: fluctuating    ESS:      REVIEW OF SYSTEMS     All other systems have been reviewed and are negative.    ALLERGIES     No Known Allergies    MEDICATIONS     Current Outpatient Medications   Medication Sig Dispense Refill    aspirin 81 mg chewable tablet Chew 1 tablet (81 mg) once daily. 90 tablet 2    atorvastatin (Lipitor) 80 mg tablet Take 1 tablet (80 mg) by mouth once daily at bedtime. 90 tablet 3    ciprofloxacin (Ciloxan) 0.3 % ophthalmic solution 3 drops every 12 hours if needed.      ciprofloxacin-dexamethasone (CiproDEX) otic suspension 4 drops.      loratadine (Claritin) 10 mg tablet Take 1 tablet (10 mg) by mouth once daily.      multivitamin with minerals tablet Take 1 tablet by mouth once daily.       No current facility-administered medications for this visit.       PAST HISTORIES     PERTINENT PAST MEDICAL HISTORY: See HPI    PERTINENT PAST SURGICAL HISTORY for Sleep Medicine:  non-contributory    PERTINENT FAMILY HISTORY for Sleep Medicine:  Patient denies family history of any sleep disorder.    PERTINENT SOCIAL HISTORY:  He  reports that he has never smoked. He has never used smokeless tobacco. He reports that he does not  "currently use alcohol. He reports that he does not use drugs. He currently lives with spouse.    Active Problems, Allergy List, Medication List, and PMH/PSH/FH/Social Hx have been reviewed and reconciled in chart. No significant changes unless documented in the pertinent chart section. Updates made when necessary.     PHYSICAL EXAM     VITAL SIGNS: There were no vitals taken for this visit.    CURRENT WEIGHT:   There were no vitals filed for this visit.       PREVIOUS WEIGHTS:  Wt Readings from Last 3 Encounters:   01/13/25 86.2 kg (190 lb)   11/04/24 83 kg (183 lb)   10/17/24 84.5 kg (186 lb 3.2 oz)     Constitutional: Alert and oriented, cooperative, no obvious distress   HEENT: Non icteric or anemic, EOM WNL bilaterally   Neck: Supple, no JVD, no goiter, no adenopathy, no rigidity     RESULTS/DATA     No results found for: \"IRON\", \"TRANSFERRIN\", \"IRONSAT\", \"TIBC\", \"FERRITIN\"    Bicarbonate   Date Value Ref Range Status   05/22/2024 25 21 - 32 mmol/L Final       ASSESSMENT/PLAN     Mr. Olmedo is a 50 y.o. male and He was referred to the [facility] for evaluation of JOSÉ    Problem List, Orders, Assessment, Recommendations:    # JOSÉ, mild  - HSAT 11/13/24- mild JOSÉ with AHI 3% 9.9, AHI 4% 8.3, SpO2 evelyne 71.2  - Personally reviewed the sleep study's raw data such as interpretation report, data sheet, and hypnogram. Discussed sleep study results with patient today. A copy of recent testing was either given to patient or released in [employer].   - Discussed optional treatment of mild JOSÉ.  Recommend PAP therapy versus OAT due to previous history of stroke.  Positional therapy with use of tennis ball or bumper belt may be reasonable as well.  -Patient would like to further discuss with his wife before making a decision on treatment.  Advised to contact me via [employer] message with his decision  - Sleep apnea, PAP therapy education as well as the tips to be successful with PAP " treatment was provided at length in clinic today. Patient verbalized understanding.  - Discussed 30-day mask guarantee and insurance requirement regarding PAP compliance and follow-up.   - Diet, exercise, and weight loss were emphasized today in clinic, as were non-supine sleep, avoiding alcohol in the late evening, and driving or operating heavy machinery when sleepy.     #Hx Stroke s/p TNK on 5/6/2024   - On meds, defer management to neuro (notes personally reviewed)      ECHO 5/7/24: CONCLUSIONS:   1. Left ventricular systolic function is normal with a 65-70% estimated ejection fraction.   2. RVSP within normal limits.    # ALLERGIC RHINITIS / SEASONAL ALLERGIES / CHRONIC NASAL CONGESTION  - Using saline nasal spray ,fluticasone nasal spray 2 sprays per nostril once daily 1 hour before bedtime, and Claritin with fair control of symptoms  - Recommend adding humidifier to bedroom   - Educated patient on [place] use of intranasal steroid spray    - Follows with ENT at [facility]    All of patient's questions were answered. He verbalizes understanding and agreement with my assessment and plan.    Disposition    Follow up in 3 months

## 2025-04-14 ENCOUNTER — APPOINTMENT (OUTPATIENT)
Facility: CLINIC | Age: 50
End: 2025-04-14
Payer: COMMERCIAL

## 2025-04-14 DIAGNOSIS — G47.33 OSA (OBSTRUCTIVE SLEEP APNEA): Primary | ICD-10-CM

## 2025-05-01 LAB
NON-UH HIE FERRITIN: 60 NG/ML (ref 22–322)
NON-UH HIE HEPATITIS B SURFACE ANTIBODY QUANT: <3.1
NON-UH HIE HEPATITIS B SURFACE ANTIBODY: NORMAL
NON-UH HIE HEPATITIS B SURFACE ANTIGEN: NORMAL
NON-UH HIE HEPATITIS C ANTIBODY: NORMAL
NON-UH HIE IRON: 104 UG/DL (ref 65–175)
NON-UH HIE SATURATION: 30.2 % (ref 20–50)
NON-UH HIE TIBC: 344 UG/ML (ref 250–425)

## 2025-05-02 LAB
NON-UH HIE ANTI-NUCLEAR ANTIBODY: NORMAL
NON-UH HIE HEPATITIS A ANTIBODIES, TOTAL: POSITIVE

## 2025-05-03 LAB
NON-UH HIE ALPHA 1 ANTITRYPSIN: 136 MG/DL (ref 90–200)
NON-UH HIE ANTI-MITOCHONDRIAL ANTIBODIES: 4.9 (ref 0–24.9)
NON-UH HIE CERULOPLASMIN: 22 MG/DL (ref 15–30)
NON-UH HIE SMOOTH MUSCLE AB, IGG TITER: NORMAL

## 2025-05-04 LAB — NON-UH HIE LIVER-KIDNEY MICROSOME ABS, IGG: NORMAL

## 2025-06-03 NOTE — PROGRESS NOTES
Patient: Jerardo Olmedo  : 1975 AGE: 50 y.o. SEX:male   MRN: [id number]   Provider: CHAD Rubi     [facility]   Service Date: 2025     PCP: Pablo Ruggiero MD   Referred by: No ref. provider found          [facility]  Follow Up Visit Note      HISTORY OF PRESENT ILLNESS     Jerardo Olmedo is a 50 y.o. male with a h/o stroke s/p TNK on 2024 who presents to [facility].    25: Patient has been sleeping well since last visit. Sleep has improved since being off school for the summer.  Goes to bed around 10 PM, falls asleep quickly, sometimes wakes 1 time per night due to nocturia and is able to fall back asleep without issue.  Wake time for the day at 7 AM.  Reports TST 7-8 hours per night.  Wakes feeling refreshed and denies EDS.  His BP has been well-controlled, as he monitors BP regularly at home with average systolic BP of 120/140 mmHg. ---->  Okay to  continue with conservative management       25: Here to review HSAT results. [place] like he did not sleep well night of sleep study.  Sleep same as prior visit. ---> discussed JOSÉ tx options, pt would like to further discuss with his wife before making a decision    24: NPV, referred by neurology; Juan Stokes with concerns of JOSÉ evaluation. Recent stroke s/p TNK on 2024. Patient presents with wife, Leisa who aides in interview today.  Reports loud snoring, witnessed apneas, occasional gasping for air (reports with supine sleep), nasal congestion, unrefreshed sleep. Wife has to sleep with a sound machine due to snoring.  Patient reports some bothersome allergies- uses sinus rinse every night.  Takes OTC Claritin seasonally (Spring). ---> HSAT ordered       ESS: 5    REVIEW OF SYSTEMS     All other systems have been reviewed and are negative.    ALLERGIES     No Known Allergies    MEDICATIONS     Current Outpatient  "Medications   Medication Sig Dispense Refill    aspirin 81 mg chewable tablet Chew 1 tablet (81 mg) once daily. 90 tablet 2    atorvastatin (Lipitor) 80 mg tablet Take 1 tablet (80 mg) by mouth once daily at bedtime. 90 tablet 3    ciprofloxacin (Ciloxan) 0.3 % ophthalmic solution 3 drops every 12 hours if needed.      ciprofloxacin-dexamethasone (CiproDEX) otic suspension 4 drops.      loratadine (Claritin) 10 mg tablet Take 1 tablet (10 mg) by mouth once daily.      multivitamin with minerals tablet Take 1 tablet by mouth once daily.       No current facility-administered medications for this visit.       PAST HISTORIES     PERTINENT PAST MEDICAL HISTORY: See HPI    PERTINENT PAST SURGICAL HISTORY for Sleep Medicine:  non-contributory    PERTINENT FAMILY HISTORY for Sleep Medicine:  Patient denies family history of any sleep disorder.    PERTINENT SOCIAL HISTORY:  He  reports that he has never smoked. He has never used smokeless tobacco. He reports that he does not currently use alcohol. He reports that he does not use drugs. He currently lives with spouse.    Active Problems, Allergy List, Medication List, and PMH/PSH/FH/Social Hx have been reviewed and reconciled in chart. No significant changes unless documented in the pertinent chart section. Updates made when necessary.     PHYSICAL EXAM     VITAL SIGNS: /81   Pulse 57   Temp 36.2 °C (97.1 °F)   Ht 1.88 m (6' 2\")   Wt 83.9 kg (185 lb)   BMI 23.75 kg/m²     CURRENT WEIGHT:   Vitals:    06/09/25 1423   Weight: 83.9 kg (185 lb)          PREVIOUS WEIGHTS:  Wt Readings from Last 3 Encounters:   06/09/25 83.9 kg (185 lb)   01/13/25 86.2 kg (190 lb)   11/04/24 83 kg (183 lb)     Constitutional: Alert and oriented, cooperative, no obvious distress   HEENT: Non icteric or anemic, EOM WNL bilaterally   Neck: Supple, no JVD, no goiter, no adenopathy, no rigidity     RESULTS/DATA     No results found for: \"IRON\", \"TRANSFERRIN\", \"IRONSAT\", \"TIBC\", " "\"FERRITIN\"    Bicarbonate   Date Value Ref Range Status   05/22/2024 25 21 - 32 mmol/L Final       ASSESSMENT/PLAN     Mr. Olmedo is a 50 y.o. male and He was referred to the [facility] for evaluation of JOSÉ    Problem List, Orders, Assessment, Recommendations:    # JOSÉ, mild  - HSAT 11/13/24- mild JOSÉ with AHI 3% 9.9, AHI 4% 8.3, SpO2 evelyne 71.2  -Given that he has only mild JOSÉ with no symptoms, conservative management with weight loss and sleeping on his side is reasonable ( Positional therapy with use of tennis ball or bumper belt). The patient prefers the approach of conservative management. If he develops issues of increasing sleepiness we could reconsider other interventions      #Hx Stroke s/p TNK on 5/6/2024   - On meds, defer management to neuro (notes personally reviewed)      ECHO 5/7/24: CONCLUSIONS:   1. Left ventricular systolic function is normal with a 65-70% estimated ejection fraction.   2. RVSP within normal limits.    # ALLERGIC RHINITIS / SEASONAL ALLERGIES / CHRONIC NASAL CONGESTION  - Using saline nasal spray ,fluticasone nasal spray 2 sprays per nostril once daily 1 hour before bedtime, and Claritin with fair control of symptoms  - Recommend adding humidifier to bedroom   - Educated patient on [place] use of intranasal steroid spray    - Follows with ENT at [facility]    All of patient's questions were answered. He verbalizes understanding and agreement with my assessment and plan.    Disposition    Follow up as needed    I personally spent 20 minutes today (exclusive of procedures) providing care for this patient, including preparation, face to face time, EMR documentation and other services such as review of medical records, diagnostic results, patient education, counseling, and coordination of care.       "

## 2025-06-09 ENCOUNTER — OFFICE VISIT (OUTPATIENT)
Facility: CLINIC | Age: 50
End: 2025-06-09
Payer: COMMERCIAL

## 2025-06-09 VITALS
TEMPERATURE: 97.1 F | WEIGHT: 185 LBS | SYSTOLIC BLOOD PRESSURE: 143 MMHG | BODY MASS INDEX: 23.74 KG/M2 | HEART RATE: 57 BPM | HEIGHT: 74 IN | DIASTOLIC BLOOD PRESSURE: 81 MMHG

## 2025-06-09 DIAGNOSIS — Z78.9 NONSMOKER: ICD-10-CM

## 2025-06-09 DIAGNOSIS — G47.33 OSA (OBSTRUCTIVE SLEEP APNEA): Primary | ICD-10-CM

## 2025-06-09 PROCEDURE — 1036F TOBACCO NON-USER: CPT | Performed by: NURSE PRACTITIONER

## 2025-06-09 PROCEDURE — 99213 OFFICE O/P EST LOW 20 MIN: CPT | Performed by: NURSE PRACTITIONER

## 2025-06-09 PROCEDURE — 3008F BODY MASS INDEX DOCD: CPT | Performed by: NURSE PRACTITIONER

## 2025-06-09 ASSESSMENT — SLEEP AND FATIGUE QUESTIONNAIRES
HOW LIKELY ARE YOU TO NOD OFF OR FALL ASLEEP IN A CAR, WHILE STOPPED FOR A FEW MINUTES IN TRAFFIC: WOULD NEVER DOZE
HOW LIKELY ARE YOU TO NOD OFF OR FALL ASLEEP WHILE SITTING AND READING: SLIGHT CHANCE OF DOZING
HOW LIKELY ARE YOU TO NOD OFF OR FALL ASLEEP WHILE WATCHING TV: SLIGHT CHANCE OF DOZING
ESS-CHAD TOTAL SCORE: 5
HOW LIKELY ARE YOU TO NOD OFF OR FALL ASLEEP WHILE SITTING QUIETLY AFTER LUNCH WITHOUT ALCOHOL: WOULD NEVER DOZE
SITING INACTIVE IN A PUBLIC PLACE LIKE A CLASS ROOM OR A MOVIE THEATER: WOULD NEVER DOZE
HOW LIKELY ARE YOU TO NOD OFF OR FALL ASLEEP WHILE LYING DOWN TO REST IN THE AFTERNOON WHEN CIRCUMSTANCES PERMIT: MODERATE CHANCE OF DOZING
HOW LIKELY ARE YOU TO NOD OFF OR FALL ASLEEP WHEN YOU ARE A PASSENGER IN A CAR FOR AN HOUR WITHOUT A BREAK: SLIGHT CHANCE OF DOZING
HOW LIKELY ARE YOU TO NOD OFF OR FALL ASLEEP WHILE SITTING AND TALKING TO SOMEONE: WOULD NEVER DOZE

## 2025-06-09 ASSESSMENT — PATIENT HEALTH QUESTIONNAIRE - PHQ9
SUM OF ALL RESPONSES TO PHQ9 QUESTIONS 1 AND 2: 0
1. LITTLE INTEREST OR PLEASURE IN DOING THINGS: NOT AT ALL
2. FEELING DOWN, DEPRESSED OR HOPELESS: NOT AT ALL

## 2025-08-08 ENCOUNTER — APPOINTMENT (OUTPATIENT)
Dept: NEUROLOGY | Facility: CLINIC | Age: 50
End: 2025-08-08
Payer: COMMERCIAL

## 2025-08-08 VITALS
BODY MASS INDEX: 23.38 KG/M2 | HEART RATE: 69 BPM | SYSTOLIC BLOOD PRESSURE: 152 MMHG | HEIGHT: 74 IN | DIASTOLIC BLOOD PRESSURE: 89 MMHG | WEIGHT: 182.2 LBS

## 2025-08-08 DIAGNOSIS — G51.0 FACIAL PALSY: Primary | ICD-10-CM

## 2025-08-08 DIAGNOSIS — I63.9 STROKE ABORTED BY ADMINISTRATION OF THROMBOLYTIC AGENT (MULTI): ICD-10-CM

## 2025-08-08 PROCEDURE — 1036F TOBACCO NON-USER: CPT | Performed by: STUDENT IN AN ORGANIZED HEALTH CARE EDUCATION/TRAINING PROGRAM

## 2025-08-08 PROCEDURE — 3008F BODY MASS INDEX DOCD: CPT | Performed by: STUDENT IN AN ORGANIZED HEALTH CARE EDUCATION/TRAINING PROGRAM

## 2025-08-08 PROCEDURE — 99215 OFFICE O/P EST HI 40 MIN: CPT | Performed by: STUDENT IN AN ORGANIZED HEALTH CARE EDUCATION/TRAINING PROGRAM

## 2025-08-08 RX ORDER — DIAZEPAM 5 MG/1
5 TABLET ORAL
COMMUNITY
Start: 2025-07-24 | End: 2025-10-16

## 2025-08-08 RX ORDER — ARTIFICIAL TEARS 1; 2; 3 MG/ML; MG/ML; MG/ML
1 SOLUTION/ DROPS OPHTHALMIC 4 TIMES DAILY PRN
Qty: 30 ML | Refills: 5 | Status: SHIPPED | OUTPATIENT
Start: 2025-08-08

## 2025-08-08 RX ORDER — AMOXICILLIN AND CLAVULANATE POTASSIUM 875; 125 MG/1; MG/1
TABLET, FILM COATED ORAL
COMMUNITY
Start: 2025-07-30 | End: 2025-08-09

## 2025-08-08 RX ORDER — OFLOXACIN 3 MG/ML
5 SOLUTION AURICULAR (OTIC)
COMMUNITY
Start: 2025-07-30

## 2025-08-08 RX ORDER — NAPROXEN SODIUM 220 MG/1
81 TABLET, FILM COATED ORAL DAILY
Start: 2025-08-08

## 2025-08-08 RX ORDER — DEXAMETHASONE INTENSOL 1 MG/ML
5 SOLUTION, CONCENTRATE ORAL
COMMUNITY
Start: 2024-10-30

## 2025-08-08 RX ORDER — ONDANSETRON 4 MG/1
4 TABLET, FILM COATED ORAL
COMMUNITY
Start: 2025-07-25

## 2025-08-08 ASSESSMENT — PATIENT HEALTH QUESTIONNAIRE - PHQ9
2. FEELING DOWN, DEPRESSED OR HOPELESS: NOT AT ALL
SUM OF ALL RESPONSES TO PHQ9 QUESTIONS 1 & 2: 0
1. LITTLE INTEREST OR PLEASURE IN DOING THINGS: NOT AT ALL

## 2025-08-08 NOTE — PATIENT INSTRUCTIONS
Thank you for your visit today. You were seen by Dr. Redding for post-surgical facial and vestibulocochlear palsy. If you have any questions or need to reach me, call my office at [phone].    We discussed the following plan today:   Regular artificial tears QID to the affected eye while awake, up to hourly as needed  Ointment formulation of artificial tears at bed time, as well as taping the affected eye shut during sleep. Patching alone is not generally recommended but you can keep using it also.   Neurosurgery referral  Continue aspirin indefinitely  Return in 3 months

## 2025-08-08 NOTE — PROGRESS NOTES
Subjective   Jerardo Olmedo is a 50 y.o.   male. FUV ESUS, however now with new facial palsy    He had mastoiditis surgery at [facility] [place] 2 weeks ago on 7/23. After the surgery, he woke up with a near complete left facial palsy along with vertigo. He completed two steroid tapers with no benefit. He had intra-operative evoked potentials which suggested a partial CN8 injury rather than a complete resection. He did not have worsened hearing after but it was always poor.    Prior hx (from [place])  PMH of  Is here being seen s/p hospitalization for stroke-like symptoms of acute dizziness, right-sided weakness and mild headache. NIHSS 7, CTA showed thrombus with marked narrowing of V4 segment of right vertebral artery, and he was administered TNK and sent to ICU for neuro monitoring. MRI confirmed and acute infarct in right cerebellar region. TTE normal. Hypercoagulable work up normal. HgbA1c 5.6. He was discharged on aspirin, high intensity statin, and holter monitor. He presented to the ED again on 5/22/24 for BLE soreness and rash x 5-6 days. He was diagnosed with panniculitis/cellulitis, discharged on Keflex.   Today, he continues with dizziness when getting up and down, denies falls at home. Continues with aspirin and statin-no bleeding issues or side effects. He has finished PT/OT. He is eating and drinking ok, down 15#, exercising daily. He has seen his PCP since discharge.   I have reviewed the medications, labs, imaging results, and the chart. Review of systems are negative unless otherwise specified in HPI.     Objective   Neurological Exam  Physical Exam  Complete left LMN facial droop with absent movement  Absent VOR and hearing on left  EOM full range. Normal finger to nose. 5/5 strength throughout. Normal reflexes and gait.    Assessment/Plan   50 y.o.  yo man with Hx of small cerebellar infarct (ESUS), here for hospital followup after surgical left CN7/8 palsy following mastoiditis surgery on 7/23. Clinically  it appears severe so I will check with neurosurgery if he is a candidate for nerve reconstruction.  We discussed the following plan today:   Regular artificial tears QID to the affected eye while awake, up to hourly as needed  Ointment formulation of artificial tears at bed time, as well as taping the affected eye shut during sleep. Patching alone is not generally recommended but you can keep using it also.   Neurosurgery referral  Continue aspirin indefinitely  Return in 3 months

## 2025-08-12 ENCOUNTER — HOSPITAL ENCOUNTER (OUTPATIENT)
Dept: RADIOLOGY | Facility: HOSPITAL | Age: 50
Discharge: HOME | End: 2025-08-12
Payer: COMMERCIAL

## 2025-08-12 DIAGNOSIS — H70.92 MASTOIDITIS OF LEFT SIDE: ICD-10-CM

## 2025-08-12 DIAGNOSIS — H71.92 CHOLESTEATOMA OF LEFT EAR: ICD-10-CM

## 2025-08-12 PROCEDURE — 70480 CT ORBIT/EAR/FOSSA W/O DYE: CPT

## 2025-08-12 PROCEDURE — 70480 CT ORBIT/EAR/FOSSA W/O DYE: CPT | Performed by: RADIOLOGY

## 2025-08-13 ENCOUNTER — CLINICAL SUPPORT (OUTPATIENT)
Dept: AUDIOLOGY | Facility: HOSPITAL | Age: 50
End: 2025-08-13
Payer: COMMERCIAL

## 2025-08-13 ENCOUNTER — OFFICE VISIT (OUTPATIENT)
Dept: OTOLARYNGOLOGY | Facility: HOSPITAL | Age: 50
End: 2025-08-13
Payer: COMMERCIAL

## 2025-08-13 ENCOUNTER — APPOINTMENT (OUTPATIENT)
Dept: AUDIOLOGY | Facility: HOSPITAL | Age: 50
End: 2025-08-13
Payer: COMMERCIAL

## 2025-08-13 VITALS
DIASTOLIC BLOOD PRESSURE: 81 MMHG | WEIGHT: 184.1 LBS | HEART RATE: 66 BPM | TEMPERATURE: 97.5 F | HEIGHT: 74 IN | SYSTOLIC BLOOD PRESSURE: 139 MMHG | BODY MASS INDEX: 23.63 KG/M2

## 2025-08-13 DIAGNOSIS — Z86.69 HISTORY OF CHOLESTEATOMA: ICD-10-CM

## 2025-08-13 DIAGNOSIS — H74.22 DISCONTINUITY OF OSSICLES OF LEFT EAR: ICD-10-CM

## 2025-08-13 DIAGNOSIS — G51.0 FACIAL PARALYSIS: Primary | ICD-10-CM

## 2025-08-13 DIAGNOSIS — H90.72 MIXED CONDUCTIVE AND SENSORINEURAL HEARING LOSS OF LEFT EAR WITH UNRESTRICTED HEARING OF RIGHT EAR: ICD-10-CM

## 2025-08-13 DIAGNOSIS — H61.22 IMPACTED CERUMEN OF LEFT EAR: ICD-10-CM

## 2025-08-13 DIAGNOSIS — H83.2X2 VESTIBULAR HYPOFUNCTION OF LEFT EAR: ICD-10-CM

## 2025-08-13 DIAGNOSIS — H90.3 ASYMMETRIC SNHL (SENSORINEURAL HEARING LOSS): Primary | ICD-10-CM

## 2025-08-13 DIAGNOSIS — Z01.818 PREOPERATIVE CLEARANCE: ICD-10-CM

## 2025-08-13 PROCEDURE — 92557 COMPREHENSIVE HEARING TEST: CPT

## 2025-08-13 PROCEDURE — 99205 OFFICE O/P NEW HI 60 MIN: CPT | Performed by: OTOLARYNGOLOGY

## 2025-08-13 PROCEDURE — 69210 REMOVE IMPACTED EAR WAX UNI: CPT | Performed by: OTOLARYNGOLOGY

## 2025-08-13 PROCEDURE — 3008F BODY MASS INDEX DOCD: CPT | Performed by: OTOLARYNGOLOGY

## 2025-08-13 PROCEDURE — 92550 TYMPANOMETRY & REFLEX THRESH: CPT | Mod: 52

## 2025-08-13 PROCEDURE — 1036F TOBACCO NON-USER: CPT | Performed by: OTOLARYNGOLOGY

## 2025-08-14 ENCOUNTER — APPOINTMENT (OUTPATIENT)
Dept: AUDIOLOGY | Facility: CLINIC | Age: 50
End: 2025-08-14
Payer: COMMERCIAL

## 2025-08-14 ENCOUNTER — TELEPHONE (OUTPATIENT)
Dept: OTOLARYNGOLOGY | Facility: CLINIC | Age: 50
End: 2025-08-14
Payer: COMMERCIAL

## 2025-08-14 PROBLEM — H83.2X2 VESTIBULAR HYPOFUNCTION OF LEFT EAR: Status: ACTIVE | Noted: 2025-08-14

## 2025-08-14 PROBLEM — G51.0 FACIAL PARALYSIS: Status: ACTIVE | Noted: 2025-08-14

## 2025-08-14 PROBLEM — H90.72 MIXED CONDUCTIVE AND SENSORINEURAL HEARING LOSS OF LEFT EAR WITH UNRESTRICTED HEARING OF RIGHT EAR: Status: ACTIVE | Noted: 2025-08-14

## 2025-08-14 PROBLEM — H61.22 IMPACTED CERUMEN OF LEFT EAR: Status: ACTIVE | Noted: 2025-08-14

## 2025-08-14 PROBLEM — H74.22 DISCONTINUITY OF OSSICLES OF LEFT EAR: Status: ACTIVE | Noted: 2025-08-14

## 2025-08-14 PROBLEM — Z86.69 HISTORY OF CHOLESTEATOMA: Status: ACTIVE | Noted: 2025-08-14

## 2025-08-14 LAB
ALBUMIN SERPL-MCNC: 4.6 G/DL (ref 3.6–5.1)
ALP SERPL-CCNC: 67 U/L (ref 36–130)
ALT SERPL-CCNC: 112 U/L (ref 9–46)
ANION GAP SERPL CALCULATED.4IONS-SCNC: 9 MMOL/L (CALC) (ref 7–17)
AST SERPL-CCNC: 49 U/L (ref 10–40)
BILIRUB SERPL-MCNC: 0.8 MG/DL (ref 0.2–1.2)
BUN SERPL-MCNC: 14 MG/DL (ref 7–25)
CALCIUM SERPL-MCNC: 9.7 MG/DL (ref 8.6–10.3)
CHLORIDE SERPL-SCNC: 105 MMOL/L (ref 98–110)
CO2 SERPL-SCNC: 29 MMOL/L (ref 20–32)
CREAT SERPL-MCNC: 0.98 MG/DL (ref 0.6–1.29)
EGFRCR SERPLBLD CKD-EPI 2021: 95 ML/MIN/1.73M2
ERYTHROCYTE [DISTWIDTH] IN BLOOD BY AUTOMATED COUNT: 12.6 % (ref 11–15)
GLUCOSE SERPL-MCNC: 77 MG/DL (ref 65–99)
HCT VFR BLD AUTO: 45.8 % (ref 38.5–50)
HGB BLD-MCNC: 15.1 G/DL (ref 13.2–17.1)
MCH RBC QN AUTO: 29.5 PG (ref 27–33)
MCHC RBC AUTO-ENTMCNC: 33 G/DL (ref 32–36)
MCV RBC AUTO: 89.5 FL (ref 80–100)
PLATELET # BLD AUTO: 209 THOUSAND/UL (ref 140–400)
PMV BLD REES-ECKER: 11.4 FL (ref 7.5–12.5)
POTASSIUM SERPL-SCNC: 4.1 MMOL/L (ref 3.5–5.3)
PROT SERPL-MCNC: 6.5 G/DL (ref 6.1–8.1)
RBC # BLD AUTO: 5.12 MILLION/UL (ref 4.2–5.8)
SODIUM SERPL-SCNC: 143 MMOL/L (ref 135–146)
WBC # BLD AUTO: 7.5 THOUSAND/UL (ref 3.8–10.8)

## 2025-08-14 RX ORDER — CEFAZOLIN SODIUM 2 G/100ML
2 INJECTION, SOLUTION INTRAVENOUS ONCE
Status: CANCELLED | OUTPATIENT
Start: 2025-08-14 | End: 2025-08-14

## 2025-08-15 ENCOUNTER — ANESTHESIA EVENT (OUTPATIENT)
Dept: OPERATING ROOM | Facility: HOSPITAL | Age: 50
End: 2025-08-15
Payer: COMMERCIAL

## 2025-08-15 ENCOUNTER — ANESTHESIA (OUTPATIENT)
Dept: OPERATING ROOM | Facility: HOSPITAL | Age: 50
End: 2025-08-15
Payer: COMMERCIAL

## 2025-08-15 ENCOUNTER — HOSPITAL ENCOUNTER (OUTPATIENT)
Facility: HOSPITAL | Age: 50
Setting detail: SURGERY ADMIT
Discharge: HOME | End: 2025-08-15
Attending: OTOLARYNGOLOGY | Admitting: OTOLARYNGOLOGY
Payer: COMMERCIAL

## 2025-08-15 VITALS
HEART RATE: 97 BPM | SYSTOLIC BLOOD PRESSURE: 153 MMHG | RESPIRATION RATE: 16 BRPM | TEMPERATURE: 98.8 F | DIASTOLIC BLOOD PRESSURE: 72 MMHG | OXYGEN SATURATION: 97 % | WEIGHT: 179.9 LBS | BODY MASS INDEX: 23.09 KG/M2

## 2025-08-15 DIAGNOSIS — G51.0 FACIAL PARALYSIS: Primary | ICD-10-CM

## 2025-08-15 LAB
ABO GROUP (TYPE) IN BLOOD: NORMAL
ANTIBODY SCREEN: NORMAL
RH FACTOR (ANTIGEN D): NORMAL

## 2025-08-15 PROCEDURE — 2780000003 HC OR 278 NO HCPCS: Performed by: OTOLARYNGOLOGY

## 2025-08-15 PROCEDURE — 2500000004 HC RX 250 GENERAL PHARMACY W/ HCPCS (ALT 636 FOR OP/ED)

## 2025-08-15 PROCEDURE — 2500000001 HC RX 250 WO HCPCS SELF ADMINISTERED DRUGS (ALT 637 FOR MEDICARE OP)

## 2025-08-15 PROCEDURE — 7100000002 HC RECOVERY ROOM TIME - EACH INCREMENTAL 1 MINUTE: Performed by: OTOLARYNGOLOGY

## 2025-08-15 PROCEDURE — 2500000004 HC RX 250 GENERAL PHARMACY W/ HCPCS (ALT 636 FOR OP/ED): Performed by: OTOLARYNGOLOGY

## 2025-08-15 PROCEDURE — 67917 REPAIR EYELID DEFECT: CPT | Performed by: STUDENT IN AN ORGANIZED HEALTH CARE EDUCATION/TRAINING PROGRAM

## 2025-08-15 PROCEDURE — 7100000001 HC RECOVERY ROOM TIME - INITIAL BASE CHARGE: Performed by: OTOLARYNGOLOGY

## 2025-08-15 PROCEDURE — C1763 CONN TISS, NON-HUMAN: HCPCS | Performed by: OTOLARYNGOLOGY

## 2025-08-15 PROCEDURE — 3600000003 HC OR TIME - INITIAL BASE CHARGE - PROCEDURE LEVEL THREE: Performed by: OTOLARYNGOLOGY

## 2025-08-15 PROCEDURE — 7100000010 HC PHASE TWO TIME - EACH INCREMENTAL 1 MINUTE: Performed by: OTOLARYNGOLOGY

## 2025-08-15 PROCEDURE — 2720000007 HC OR 272 NO HCPCS: Performed by: OTOLARYNGOLOGY

## 2025-08-15 PROCEDURE — 67912 CORRECTION EYELID W/IMPLANT: CPT | Performed by: STUDENT IN AN ORGANIZED HEALTH CARE EDUCATION/TRAINING PROGRAM

## 2025-08-15 PROCEDURE — 2500000005 HC RX 250 GENERAL PHARMACY W/O HCPCS: Performed by: OTOLARYNGOLOGY

## 2025-08-15 PROCEDURE — L8610 OCULAR IMPLANT: HCPCS | Performed by: OTOLARYNGOLOGY

## 2025-08-15 PROCEDURE — 3700000001 HC GENERAL ANESTHESIA TIME - INITIAL BASE CHARGE: Performed by: OTOLARYNGOLOGY

## 2025-08-15 PROCEDURE — 3600000008 HC OR TIME - EACH INCREMENTAL 1 MINUTE - PROCEDURE LEVEL THREE: Performed by: OTOLARYNGOLOGY

## 2025-08-15 PROCEDURE — 3700000002 HC GENERAL ANESTHESIA TIME - EACH INCREMENTAL 1 MINUTE: Performed by: OTOLARYNGOLOGY

## 2025-08-15 PROCEDURE — 2500000002 HC RX 250 W HCPCS SELF ADMINISTERED DRUGS (ALT 637 FOR MEDICARE OP, ALT 636 FOR OP/ED): Performed by: OTOLARYNGOLOGY

## 2025-08-15 PROCEDURE — A67912 PR CORRECTION EYELID W/ IMPLANT: Performed by: ANESTHESIOLOGY

## 2025-08-15 PROCEDURE — 2500000004 HC RX 250 GENERAL PHARMACY W/ HCPCS (ALT 636 FOR OP/ED): Mod: JW

## 2025-08-15 PROCEDURE — 7100000009 HC PHASE TWO TIME - INITIAL BASE CHARGE: Performed by: OTOLARYNGOLOGY

## 2025-08-15 PROCEDURE — 36415 COLL VENOUS BLD VENIPUNCTURE: CPT | Performed by: OTOLARYNGOLOGY

## 2025-08-15 PROCEDURE — 86901 BLOOD TYPING SEROLOGIC RH(D): CPT | Performed by: OTOLARYNGOLOGY

## 2025-08-15 DEVICE — GRAFT, BIODESIGN OTOLOGIC REPAIR  2.5 X 2.5: Type: IMPLANTABLE DEVICE | Site: EAR | Status: FUNCTIONAL

## 2025-08-15 DEVICE — IMPLANTABLE DEVICE: Type: IMPLANTABLE DEVICE | Site: EYE | Status: FUNCTIONAL

## 2025-08-15 RX ORDER — PREDNISONE 10 MG/1
TABLET ORAL
Qty: 60 TABLET | Refills: 0 | Status: SHIPPED | OUTPATIENT
Start: 2025-08-15

## 2025-08-15 RX ORDER — KETOROLAC TROMETHAMINE 30 MG/ML
INJECTION, SOLUTION INTRAMUSCULAR; INTRAVENOUS AS NEEDED
Status: DISCONTINUED | OUTPATIENT
Start: 2025-08-15 | End: 2025-08-15

## 2025-08-15 RX ORDER — HYDROMORPHONE HYDROCHLORIDE 0.2 MG/ML
0.2 INJECTION INTRAMUSCULAR; INTRAVENOUS; SUBCUTANEOUS EVERY 5 MIN PRN
Status: DISCONTINUED | OUTPATIENT
Start: 2025-08-15 | End: 2025-08-15 | Stop reason: HOSPADM

## 2025-08-15 RX ORDER — TRAMADOL HYDROCHLORIDE 50 MG/1
50 TABLET, FILM COATED ORAL EVERY 6 HOURS PRN
Qty: 20 TABLET | Refills: 0 | Status: SHIPPED | OUTPATIENT
Start: 2025-08-15 | End: 2025-08-20

## 2025-08-15 RX ORDER — SODIUM CHLORIDE, SODIUM LACTATE, POTASSIUM CHLORIDE, CALCIUM CHLORIDE 600; 310; 30; 20 MG/100ML; MG/100ML; MG/100ML; MG/100ML
100 INJECTION, SOLUTION INTRAVENOUS CONTINUOUS
Status: DISCONTINUED | OUTPATIENT
Start: 2025-08-15 | End: 2025-08-15 | Stop reason: HOSPADM

## 2025-08-15 RX ORDER — LIDOCAINE HCL/PF 100 MG/5ML
SYRINGE (ML) INTRAVENOUS AS NEEDED
Status: DISCONTINUED | OUTPATIENT
Start: 2025-08-15 | End: 2025-08-15

## 2025-08-15 RX ORDER — SODIUM CHLORIDE 0.9 G/100ML
INJECTION, SOLUTION IRRIGATION AS NEEDED
Status: DISCONTINUED | OUTPATIENT
Start: 2025-08-15 | End: 2025-08-15 | Stop reason: HOSPADM

## 2025-08-15 RX ORDER — CEFAZOLIN SODIUM 2 G/100ML
2 INJECTION, SOLUTION INTRAVENOUS ONCE
Status: DISCONTINUED | OUTPATIENT
Start: 2025-08-15 | End: 2025-08-15 | Stop reason: HOSPADM

## 2025-08-15 RX ORDER — OXYCODONE HYDROCHLORIDE 5 MG/1
5 TABLET ORAL EVERY 4 HOURS PRN
Status: DISCONTINUED | OUTPATIENT
Start: 2025-08-15 | End: 2025-08-15 | Stop reason: HOSPADM

## 2025-08-15 RX ORDER — EPINEPHRINE 1 MG/ML
INJECTION, SOLUTION, CONCENTRATE INTRAVENOUS AS NEEDED
Status: DISCONTINUED | OUTPATIENT
Start: 2025-08-15 | End: 2025-08-15 | Stop reason: HOSPADM

## 2025-08-15 RX ORDER — OXYCODONE HYDROCHLORIDE 5 MG/1
10 TABLET ORAL EVERY 4 HOURS PRN
Status: DISCONTINUED | OUTPATIENT
Start: 2025-08-15 | End: 2025-08-15 | Stop reason: HOSPADM

## 2025-08-15 RX ORDER — FENTANYL CITRATE 50 UG/ML
INJECTION, SOLUTION INTRAMUSCULAR; INTRAVENOUS AS NEEDED
Status: DISCONTINUED | OUTPATIENT
Start: 2025-08-15 | End: 2025-08-15

## 2025-08-15 RX ORDER — CIPROFLOXACIN AND DEXAMETHASONE 3; 1 MG/ML; MG/ML
SUSPENSION/ DROPS AURICULAR (OTIC) AS NEEDED
Status: DISCONTINUED | OUTPATIENT
Start: 2025-08-15 | End: 2025-08-15 | Stop reason: HOSPADM

## 2025-08-15 RX ORDER — ACETAMINOPHEN 500 MG
1000 TABLET ORAL EVERY 8 HOURS PRN
Qty: 90 TABLET | Refills: 0 | Status: SHIPPED | OUTPATIENT
Start: 2025-08-15 | End: 2025-08-30

## 2025-08-15 RX ORDER — IBUPROFEN 400 MG/1
400 TABLET, FILM COATED ORAL EVERY 6 HOURS PRN
Qty: 40 TABLET | Refills: 0 | Status: SHIPPED | OUTPATIENT
Start: 2025-08-15 | End: 2025-08-25

## 2025-08-15 RX ORDER — PHENYLEPHRINE 10 MG/250 ML(40 MCG/ML)IN 0.9 % SOD.CHLORIDE INTRAVENOUS
CONTINUOUS PRN
Status: DISCONTINUED | OUTPATIENT
Start: 2025-08-15 | End: 2025-08-15

## 2025-08-15 RX ORDER — CEFAZOLIN 1 G/1
INJECTION, POWDER, FOR SOLUTION INTRAVENOUS AS NEEDED
Status: DISCONTINUED | OUTPATIENT
Start: 2025-08-15 | End: 2025-08-15

## 2025-08-15 RX ORDER — LIDOCAINE HYDROCHLORIDE AND EPINEPHRINE 10; 10 UG/ML; MG/ML
INJECTION, SOLUTION INFILTRATION; PERINEURAL AS NEEDED
Status: DISCONTINUED | OUTPATIENT
Start: 2025-08-15 | End: 2025-08-15 | Stop reason: HOSPADM

## 2025-08-15 RX ORDER — ACETAMINOPHEN 10 MG/ML
INJECTION, SOLUTION INTRAVENOUS AS NEEDED
Status: DISCONTINUED | OUTPATIENT
Start: 2025-08-15 | End: 2025-08-15

## 2025-08-15 RX ORDER — MIDAZOLAM HYDROCHLORIDE 2 MG/2ML
INJECTION, SOLUTION INTRAMUSCULAR; INTRAVENOUS AS NEEDED
Status: DISCONTINUED | OUTPATIENT
Start: 2025-08-15 | End: 2025-08-15

## 2025-08-15 RX ORDER — ONDANSETRON HYDROCHLORIDE 2 MG/ML
INJECTION, SOLUTION INTRAVENOUS AS NEEDED
Status: DISCONTINUED | OUTPATIENT
Start: 2025-08-15 | End: 2025-08-15

## 2025-08-15 RX ORDER — PROPOFOL 10 MG/ML
INJECTION, EMULSION INTRAVENOUS AS NEEDED
Status: DISCONTINUED | OUTPATIENT
Start: 2025-08-15 | End: 2025-08-15

## 2025-08-15 RX ORDER — PHENYLEPHRINE HYDROCHLORIDE 10 MG/ML
INJECTION INTRAVENOUS AS NEEDED
Status: DISCONTINUED | OUTPATIENT
Start: 2025-08-15 | End: 2025-08-15

## 2025-08-15 RX ORDER — GLYCOPYRROLATE 0.2 MG/ML
INJECTION INTRAMUSCULAR; INTRAVENOUS AS NEEDED
Status: DISCONTINUED | OUTPATIENT
Start: 2025-08-15 | End: 2025-08-15

## 2025-08-15 RX ORDER — AMOXICILLIN 250 MG
1 CAPSULE ORAL DAILY
Qty: 10 TABLET | Refills: 0 | Status: SHIPPED | OUTPATIENT
Start: 2025-08-15 | End: 2025-08-25

## 2025-08-15 RX ORDER — CEPHALEXIN 500 MG/1
500 CAPSULE ORAL 2 TIMES DAILY
Qty: 14 CAPSULE | Refills: 0 | Status: SHIPPED | OUTPATIENT
Start: 2025-08-15 | End: 2025-08-22

## 2025-08-15 RX ORDER — ONDANSETRON HYDROCHLORIDE 2 MG/ML
4 INJECTION, SOLUTION INTRAVENOUS ONCE AS NEEDED
Status: COMPLETED | OUTPATIENT
Start: 2025-08-15 | End: 2025-08-15

## 2025-08-15 RX ORDER — LABETALOL HYDROCHLORIDE 5 MG/ML
5 INJECTION, SOLUTION INTRAVENOUS ONCE AS NEEDED
Status: DISCONTINUED | OUTPATIENT
Start: 2025-08-15 | End: 2025-08-15 | Stop reason: HOSPADM

## 2025-08-15 RX ADMIN — SODIUM CHLORIDE, SODIUM LACTATE, POTASSIUM CHLORIDE, AND CALCIUM CHLORIDE 100 ML/HR: .6; .31; .03; .02 INJECTION, SOLUTION INTRAVENOUS at 20:22

## 2025-08-15 RX ADMIN — CEFAZOLIN 2 G: 1 INJECTION, POWDER, FOR SOLUTION INTRAMUSCULAR; INTRAVENOUS at 14:35

## 2025-08-15 RX ADMIN — SODIUM CHLORIDE, SODIUM LACTATE, POTASSIUM CHLORIDE, AND CALCIUM CHLORIDE: 600; 310; 30; 20 INJECTION, SOLUTION INTRAVENOUS at 14:14

## 2025-08-15 RX ADMIN — PHENYLEPHRINE-NACL IV SOLUTION 10 MG/250ML-0.9% 0.3 MCG/KG/MIN: 10-0.9/25 SOLUTION at 15:00

## 2025-08-15 RX ADMIN — PHENYLEPHRINE HYDROCHLORIDE 80 MCG: 10 INJECTION INTRAVENOUS at 15:05

## 2025-08-15 RX ADMIN — ONDANSETRON 4 MG: 2 INJECTION INTRAMUSCULAR; INTRAVENOUS at 19:59

## 2025-08-15 RX ADMIN — GLYCOPYRROLATE 0.4 MG: 0.2 SOLUTION INTRAMUSCULAR; INTRAVENOUS at 15:20

## 2025-08-15 RX ADMIN — OXYCODONE HYDROCHLORIDE 5 MG: 5 TABLET ORAL at 20:49

## 2025-08-15 RX ADMIN — MIDAZOLAM HYDROCHLORIDE 2 MG: 2 INJECTION, SOLUTION INTRAMUSCULAR; INTRAVENOUS at 14:05

## 2025-08-15 RX ADMIN — ONDANSETRON 4 MG: 2 INJECTION INTRAMUSCULAR; INTRAVENOUS at 18:59

## 2025-08-15 RX ADMIN — HYDROMORPHONE HYDROCHLORIDE 0.5 MG: 0.5 INJECTION, SOLUTION INTRAMUSCULAR; INTRAVENOUS; SUBCUTANEOUS at 20:05

## 2025-08-15 RX ADMIN — REMIFENTANIL HYDROCHLORIDE 0.02 MCG/KG/MIN: 1 INJECTION, POWDER, LYOPHILIZED, FOR SOLUTION INTRAVENOUS at 14:35

## 2025-08-15 RX ADMIN — FENTANYL CITRATE 100 MCG: 50 INJECTION, SOLUTION INTRAMUSCULAR; INTRAVENOUS at 14:26

## 2025-08-15 RX ADMIN — GLYCOPYRROLATE 0.2 MG: 0.2 SOLUTION INTRAMUSCULAR; INTRAVENOUS at 14:41

## 2025-08-15 RX ADMIN — SUGAMMADEX 200 MG: 100 INJECTION, SOLUTION INTRAVENOUS at 19:44

## 2025-08-15 RX ADMIN — ACETAMINOPHEN 1000 MG: 10 INJECTION, SOLUTION INTRAVENOUS at 19:05

## 2025-08-15 RX ADMIN — PROPOFOL 200 MG: 10 INJECTION, EMULSION INTRAVENOUS at 14:26

## 2025-08-15 RX ADMIN — KETOROLAC TROMETHAMINE 30 MG: 30 INJECTION, SOLUTION INTRAMUSCULAR; INTRAVENOUS at 19:22

## 2025-08-15 RX ADMIN — DEXAMETHASONE SODIUM PHOSPHATE 10 MG: 4 INJECTION INTRA-ARTICULAR; INTRALESIONAL; INTRAMUSCULAR; INTRAVENOUS; SOFT TISSUE at 14:31

## 2025-08-15 RX ADMIN — CEFAZOLIN 2 G: 1 INJECTION, POWDER, FOR SOLUTION INTRAMUSCULAR; INTRAVENOUS at 18:35

## 2025-08-15 RX ADMIN — LIDOCAINE HYDROCHLORIDE 100 MG: 20 INJECTION INTRAVENOUS at 14:26

## 2025-08-15 SDOH — HEALTH STABILITY: MENTAL HEALTH: CURRENT SMOKER: 0

## 2025-08-15 ASSESSMENT — PAIN - FUNCTIONAL ASSESSMENT
PAIN_FUNCTIONAL_ASSESSMENT: 0-10

## 2025-08-15 ASSESSMENT — PAIN SCALES - GENERAL
PAINLEVEL_OUTOF10: 5 - MODERATE PAIN
PAINLEVEL_OUTOF10: 0 - NO PAIN
PAIN_LEVEL: 1
PAINLEVEL_OUTOF10: 2
PAINLEVEL_OUTOF10: 5 - MODERATE PAIN
PAINLEVEL_OUTOF10: 7
PAINLEVEL_OUTOF10: 4
PAINLEVEL_OUTOF10: 2

## 2025-08-18 ENCOUNTER — TELEPHONE (OUTPATIENT)
Dept: OTOLARYNGOLOGY | Facility: HOSPITAL | Age: 50
End: 2025-08-18
Payer: COMMERCIAL

## 2025-08-18 DIAGNOSIS — H83.2X2 VESTIBULAR HYPOFUNCTION OF LEFT EAR: ICD-10-CM

## 2025-08-19 ENCOUNTER — APPOINTMENT (OUTPATIENT)
Dept: NEUROSURGERY | Facility: CLINIC | Age: 50
End: 2025-08-19
Payer: COMMERCIAL

## 2025-09-04 ENCOUNTER — OFFICE VISIT (OUTPATIENT)
Facility: CLINIC | Age: 50
End: 2025-09-04
Payer: COMMERCIAL

## 2025-09-04 ENCOUNTER — APPOINTMENT (OUTPATIENT)
Dept: OTOLARYNGOLOGY | Facility: CLINIC | Age: 50
End: 2025-09-04
Payer: COMMERCIAL

## 2025-09-04 DIAGNOSIS — G51.0 FACIAL PARALYSIS: Primary | ICD-10-CM

## 2025-09-04 PROCEDURE — 99024 POSTOP FOLLOW-UP VISIT: CPT | Performed by: STUDENT IN AN ORGANIZED HEALTH CARE EDUCATION/TRAINING PROGRAM

## 2025-09-04 PROCEDURE — 1036F TOBACCO NON-USER: CPT | Performed by: STUDENT IN AN ORGANIZED HEALTH CARE EDUCATION/TRAINING PROGRAM

## 2025-09-04 ASSESSMENT — PAIN SCALES - GENERAL: PAINLEVEL_OUTOF10: 0-NO PAIN

## 2025-09-08 ENCOUNTER — APPOINTMENT (OUTPATIENT)
Dept: OTOLARYNGOLOGY | Facility: CLINIC | Age: 50
End: 2025-09-08
Payer: COMMERCIAL

## 2025-11-04 ENCOUNTER — APPOINTMENT (OUTPATIENT)
Dept: NEUROLOGY | Facility: CLINIC | Age: 50
End: 2025-11-04
Payer: COMMERCIAL

## 2025-12-04 ENCOUNTER — APPOINTMENT (OUTPATIENT)
Facility: CLINIC | Age: 50
End: 2025-12-04
Payer: COMMERCIAL

## (undated) DEVICE — NEEDLE, HYPODERMIC, 25 G X 1.5 IN, A BEVEL, STERILE

## (undated) DEVICE — REST, HEAD, BAGEL, 9 IN

## (undated) DEVICE — SPONGE, HEMOSTATIC, GELATIN, SURGIFOAM, 8 X 12.5 CM X 10 MM

## (undated) DEVICE — NEEDLE, FILTER 19 G X 1 IN

## (undated) DEVICE — ELECTRODE, PAIRED SUBDERMAL OTO

## (undated) DEVICE — SUTURE, VICRYL, 3-0, 27IN, RB-1

## (undated) DEVICE — COMB, HAIR, 7 IN, PLASTIC, BLACK

## (undated) DEVICE — Device

## (undated) DEVICE — BUR, FINE DIAMOND, 1MM, ROUND EXTENDED

## (undated) DEVICE — BUR, COARSE DIAMOND, 2MM ROUND

## (undated) DEVICE — BLADE, OPHTHALMIC, BEAVER, MINI, SHARP ONE SIDE

## (undated) DEVICE — GOWN, SURGICAL, SMARTGOWN, XX-LARGE, STERILE

## (undated) DEVICE — COLLECTOR, SHEEHY BONE DUST, W/ STEEL SCREEN FILTER

## (undated) DEVICE — MANIFOLD, 4 PORT NEPTUNE STANDARD

## (undated) DEVICE — CUP, SOLUTION

## (undated) DEVICE — BANDAGE, DERMACEA, 2 PLY, 3 X 4 YDS, STERILE

## (undated) DEVICE — DRAPE, TOWEL, STERI DRAPE, 17 X 11 IN, PLASTIC, STERILE

## (undated) DEVICE — BUR, COARSE DIAMOND, 3MM ROUND

## (undated) DEVICE — PAD, GROUNDING, ELECTROSURGICAL, W/9 FT CABLE, POLYHESIVE II, ADULT, LF

## (undated) DEVICE — STOCKINETTE, IMPERVIOUS, 12 X 48 IN, LF, STERILE

## (undated) DEVICE — NEEDLE, HYPODERMIC, MONOJECT, 27 G X 1.5 IN

## (undated) DEVICE — PROTECTOR, NERVE, ULNAR, PINK

## (undated) DEVICE — DRAPE, SURGICAL, OTOLOGY GLASSCOCK

## (undated) DEVICE — TUBING, SUCTION, OTOMED

## (undated) DEVICE — BAND, RUBBER, 3 IN, STERILE

## (undated) DEVICE — COVER, CART, 45 X 27 X 48 IN, CLEAR

## (undated) DEVICE — DRAPE, SMARTDRAPE, FOR TIVATO MICROSCOPE

## (undated) DEVICE — BUR, CUTTER, 5MM, ROUND

## (undated) DEVICE — WAX, BONE, 2.5 GM

## (undated) DEVICE — COVER HANDLE LIGHT, STERIS, BLUE, STERILE

## (undated) DEVICE — DRAPE, SHEET, 17 X 23 IN